# Patient Record
Sex: FEMALE | Race: OTHER | Employment: UNEMPLOYED | URBAN - METROPOLITAN AREA
[De-identification: names, ages, dates, MRNs, and addresses within clinical notes are randomized per-mention and may not be internally consistent; named-entity substitution may affect disease eponyms.]

---

## 2017-03-20 PROBLEM — Z30.431 IUD CHECK UP: Status: ACTIVE | Noted: 2017-03-20

## 2017-03-20 PROBLEM — B08.1 MOLLUSCUM CONTAGIOSUM INFECTION: Status: ACTIVE | Noted: 2017-03-20

## 2023-11-27 ENCOUNTER — OFFICE VISIT (OUTPATIENT)
Dept: FAMILY MEDICINE CLINIC | Facility: CLINIC | Age: 28
End: 2023-11-27
Payer: MEDICAID

## 2023-11-27 ENCOUNTER — LAB ENCOUNTER (OUTPATIENT)
Dept: LAB | Age: 28
End: 2023-11-27
Attending: NURSE PRACTITIONER
Payer: MEDICAID

## 2023-11-27 VITALS
HEART RATE: 100 BPM | DIASTOLIC BLOOD PRESSURE: 77 MMHG | HEIGHT: 64 IN | RESPIRATION RATE: 18 BRPM | WEIGHT: 184 LBS | OXYGEN SATURATION: 99 % | BODY MASS INDEX: 31.41 KG/M2 | SYSTOLIC BLOOD PRESSURE: 126 MMHG

## 2023-11-27 DIAGNOSIS — E66.9 CLASS 1 OBESITY WITHOUT SERIOUS COMORBIDITY WITH BODY MASS INDEX (BMI) OF 31.0 TO 31.9 IN ADULT, UNSPECIFIED OBESITY TYPE: ICD-10-CM

## 2023-11-27 DIAGNOSIS — Z00.00 GENERAL MEDICAL EXAM: ICD-10-CM

## 2023-11-27 DIAGNOSIS — Z83.3 FAMILY HISTORY OF DIABETES MELLITUS IN MOTHER: ICD-10-CM

## 2023-11-27 DIAGNOSIS — E78.2 MIXED HYPERLIPIDEMIA: ICD-10-CM

## 2023-11-27 DIAGNOSIS — Z11.3 SCREEN FOR STD (SEXUALLY TRANSMITTED DISEASE): ICD-10-CM

## 2023-11-27 DIAGNOSIS — Z76.89 ENCOUNTER TO ESTABLISH CARE: Primary | ICD-10-CM

## 2023-11-27 DIAGNOSIS — Z97.5 IUD (INTRAUTERINE DEVICE) IN PLACE: ICD-10-CM

## 2023-11-27 PROBLEM — E66.811 CLASS 1 OBESITY WITHOUT SERIOUS COMORBIDITY WITH BODY MASS INDEX (BMI) OF 31.0 TO 31.9 IN ADULT: Status: ACTIVE | Noted: 2023-11-27

## 2023-11-27 PROBLEM — B08.1 MOLLUSCUM CONTAGIOSUM INFECTION: Status: RESOLVED | Noted: 2017-03-20 | Resolved: 2023-11-27

## 2023-11-27 PROBLEM — Z87.59 HISTORY OF PREGNANCY INDUCED HYPERTENSION: Status: ACTIVE | Noted: 2023-11-27

## 2023-11-27 LAB
ALBUMIN SERPL-MCNC: 4.1 G/DL (ref 3.4–5)
ALBUMIN/GLOB SERPL: 1.1 {RATIO} (ref 1–2)
ALP LIVER SERPL-CCNC: 49 U/L
ALT SERPL-CCNC: 28 U/L
ANION GAP SERPL CALC-SCNC: 7 MMOL/L (ref 0–18)
AST SERPL-CCNC: 21 U/L (ref 15–37)
BASOPHILS # BLD AUTO: 0.05 X10(3) UL (ref 0–0.2)
BASOPHILS NFR BLD AUTO: 0.7 %
BILIRUB SERPL-MCNC: 0.4 MG/DL (ref 0.1–2)
BUN BLD-MCNC: 12 MG/DL (ref 9–23)
CALCIUM BLD-MCNC: 9.5 MG/DL (ref 8.5–10.1)
CHLORIDE SERPL-SCNC: 108 MMOL/L (ref 98–112)
CHOLEST SERPL-MCNC: 289 MG/DL (ref ?–200)
CO2 SERPL-SCNC: 24 MMOL/L (ref 21–32)
CREAT BLD-MCNC: 0.72 MG/DL
EGFRCR SERPLBLD CKD-EPI 2021: 116 ML/MIN/1.73M2 (ref 60–?)
EOSINOPHIL # BLD AUTO: 0.1 X10(3) UL (ref 0–0.7)
EOSINOPHIL NFR BLD AUTO: 1.5 %
ERYTHROCYTE [DISTWIDTH] IN BLOOD BY AUTOMATED COUNT: 11.9 %
EST. AVERAGE GLUCOSE BLD GHB EST-MCNC: 123 MG/DL (ref 68–126)
FASTING PATIENT LIPID ANSWER: YES
FASTING STATUS PATIENT QL REPORTED: YES
GLOBULIN PLAS-MCNC: 3.7 G/DL (ref 2.8–4.4)
GLUCOSE BLD-MCNC: 108 MG/DL (ref 70–99)
HBA1C MFR BLD: 5.9 % (ref ?–5.7)
HBV SURFACE AG SER-ACNC: <0.1 [IU]/L
HBV SURFACE AG SERPL QL IA: NONREACTIVE
HCT VFR BLD AUTO: 42.1 %
HDLC SERPL-MCNC: 40 MG/DL (ref 40–59)
HGB BLD-MCNC: 14.2 G/DL
IMM GRANULOCYTES # BLD AUTO: 0.03 X10(3) UL (ref 0–1)
IMM GRANULOCYTES NFR BLD: 0.4 %
LDLC SERPL CALC-MCNC: 198 MG/DL (ref ?–100)
LYMPHOCYTES # BLD AUTO: 1.95 X10(3) UL (ref 1–4)
LYMPHOCYTES NFR BLD AUTO: 29.1 %
MCH RBC QN AUTO: 30.7 PG (ref 26–34)
MCHC RBC AUTO-ENTMCNC: 33.7 G/DL (ref 31–37)
MCV RBC AUTO: 90.9 FL
MONOCYTES # BLD AUTO: 0.49 X10(3) UL (ref 0.1–1)
MONOCYTES NFR BLD AUTO: 7.3 %
NEUTROPHILS # BLD AUTO: 4.08 X10 (3) UL (ref 1.5–7.7)
NEUTROPHILS # BLD AUTO: 4.08 X10(3) UL (ref 1.5–7.7)
NEUTROPHILS NFR BLD AUTO: 61 %
NONHDLC SERPL-MCNC: 249 MG/DL (ref ?–130)
OSMOLALITY SERPL CALC.SUM OF ELEC: 288 MOSM/KG (ref 275–295)
PLATELET # BLD AUTO: 280 10(3)UL (ref 150–450)
POTASSIUM SERPL-SCNC: 4 MMOL/L (ref 3.5–5.1)
PROT SERPL-MCNC: 7.8 G/DL (ref 6.4–8.2)
RBC # BLD AUTO: 4.63 X10(6)UL
SODIUM SERPL-SCNC: 139 MMOL/L (ref 136–145)
T PALLIDUM AB SER QL IA: NONREACTIVE
TRIGL SERPL-MCNC: 258 MG/DL (ref 30–149)
TSI SER-ACNC: 1.32 MIU/ML (ref 0.36–3.74)
VLDLC SERPL CALC-MCNC: 56 MG/DL (ref 0–30)
WBC # BLD AUTO: 6.7 X10(3) UL (ref 4–11)

## 2023-11-27 PROCEDURE — 87491 CHLMYD TRACH DNA AMP PROBE: CPT

## 2023-11-27 PROCEDURE — 80061 LIPID PANEL: CPT

## 2023-11-27 PROCEDURE — 87340 HEPATITIS B SURFACE AG IA: CPT

## 2023-11-27 PROCEDURE — 87389 HIV-1 AG W/HIV-1&-2 AB AG IA: CPT

## 2023-11-27 PROCEDURE — 85025 COMPLETE CBC W/AUTO DIFF WBC: CPT

## 2023-11-27 PROCEDURE — 87591 N.GONORRHOEAE DNA AMP PROB: CPT

## 2023-11-27 PROCEDURE — 80053 COMPREHEN METABOLIC PANEL: CPT

## 2023-11-27 PROCEDURE — 84443 ASSAY THYROID STIM HORMONE: CPT

## 2023-11-27 PROCEDURE — 86780 TREPONEMA PALLIDUM: CPT

## 2023-11-27 PROCEDURE — 36415 COLL VENOUS BLD VENIPUNCTURE: CPT

## 2023-11-27 PROCEDURE — 83036 HEMOGLOBIN GLYCOSYLATED A1C: CPT

## 2023-11-27 RX ORDER — POLYMYXIN B SULFATE AND TRIMETHOPRIM 1; 10000 MG/ML; [USP'U]/ML
SOLUTION OPHTHALMIC
COMMUNITY
Start: 2023-11-15

## 2023-11-28 LAB
C TRACH DNA SPEC QL NAA+PROBE: NEGATIVE
N GONORRHOEA DNA SPEC QL NAA+PROBE: NEGATIVE

## 2023-11-29 ENCOUNTER — TELEPHONE (OUTPATIENT)
Dept: FAMILY MEDICINE CLINIC | Facility: CLINIC | Age: 28
End: 2023-11-29

## 2023-11-29 DIAGNOSIS — E78.2 MIXED HYPERLIPIDEMIA: ICD-10-CM

## 2023-11-29 DIAGNOSIS — R73.03 PREDIABETES: Primary | ICD-10-CM

## 2023-11-29 NOTE — TELEPHONE ENCOUNTER
----- Message from STANLEY Ibrahim sent at 11/29/2023  9:36 AM CST -----  A1C in prediabetic range. Needs to work on lower carb diet, recheck in 6 months  Chemistries normal except for mild hyperglycemia  Cholesterol is elevated. Needs to start statin.  If agreeable, will send Rx and plan to recheck lipid and LFTs in 6-8 weeks  Chlamydia, gonorrhea screening negative  HIV, Hepatitis B, Syphilis screening negative  Thyroid function normal  No anemia

## 2023-11-29 NOTE — TELEPHONE ENCOUNTER
LMTCB. Repeat A1C ordered. Hepatic and lipid panels pended for mid-January. Will order if pt agreeable to starting Rx.

## 2023-11-29 NOTE — TELEPHONE ENCOUNTER
Patient called back and understands her test results. Patient will recheck A1c in 6 months. Patient would like to hold off on medication and would like to eat better and exercise before starting on statin medication. Would you still like patient to recheck lipids in 6-8 weeks?

## 2023-12-18 ENCOUNTER — OFFICE VISIT (OUTPATIENT)
Dept: FAMILY MEDICINE CLINIC | Facility: CLINIC | Age: 28
End: 2023-12-18
Payer: MEDICAID

## 2023-12-18 VITALS
RESPIRATION RATE: 18 BRPM | DIASTOLIC BLOOD PRESSURE: 80 MMHG | TEMPERATURE: 98 F | WEIGHT: 183 LBS | SYSTOLIC BLOOD PRESSURE: 120 MMHG | HEART RATE: 82 BPM | BODY MASS INDEX: 31.24 KG/M2 | OXYGEN SATURATION: 98 % | HEIGHT: 64 IN

## 2023-12-18 DIAGNOSIS — Z30.431 IUD CHECK UP: ICD-10-CM

## 2023-12-18 DIAGNOSIS — T83.32XA INTRAUTERINE CONTRACEPTIVE DEVICE THREADS LOST, INITIAL ENCOUNTER: ICD-10-CM

## 2023-12-18 DIAGNOSIS — Z12.4 CERVICAL CANCER SCREENING: Primary | ICD-10-CM

## 2023-12-18 PROCEDURE — 3079F DIAST BP 80-89 MM HG: CPT | Performed by: NURSE PRACTITIONER

## 2023-12-18 PROCEDURE — 88175 CYTOPATH C/V AUTO FLUID REDO: CPT | Performed by: NURSE PRACTITIONER

## 2023-12-18 PROCEDURE — 3074F SYST BP LT 130 MM HG: CPT | Performed by: NURSE PRACTITIONER

## 2023-12-18 PROCEDURE — 99213 OFFICE O/P EST LOW 20 MIN: CPT | Performed by: NURSE PRACTITIONER

## 2023-12-18 PROCEDURE — 3008F BODY MASS INDEX DOCD: CPT | Performed by: NURSE PRACTITIONER

## 2023-12-22 ENCOUNTER — TELEPHONE (OUTPATIENT)
Dept: FAMILY MEDICINE CLINIC | Facility: CLINIC | Age: 28
End: 2023-12-22

## 2023-12-22 LAB
.: NORMAL
.: NORMAL

## 2023-12-22 NOTE — TELEPHONE ENCOUNTER
----- Message from STANLEY Arechiga sent at 12/22/2023  8:23 AM CST -----  Pap normal except for evidence of yeast  Only needs treatment for yeast if she is symptomatic.  Please check with her  Repeat in pap in 3 years no concerns

## 2023-12-22 NOTE — TELEPHONE ENCOUNTER
Patient advised and verbalized understanding.   Patient denies symptoms of yeast infection  Patient will call if she develops symptoms  Reminder placed for pap in 3 years

## 2023-12-30 ENCOUNTER — HOSPITAL ENCOUNTER (OUTPATIENT)
Dept: ULTRASOUND IMAGING | Age: 28
Discharge: HOME | End: 2023-12-30
Attending: NURSE PRACTITIONER
Payer: MEDICAID

## 2023-12-30 DIAGNOSIS — Z30.431 IUD CHECK UP: ICD-10-CM

## 2023-12-30 DIAGNOSIS — T83.32XA INTRAUTERINE CONTRACEPTIVE DEVICE THREADS LOST, INITIAL ENCOUNTER: ICD-10-CM

## 2023-12-30 PROCEDURE — 76830 TRANSVAGINAL US NON-OB: CPT | Performed by: NURSE PRACTITIONER

## 2023-12-30 PROCEDURE — 76856 US EXAM PELVIC COMPLETE: CPT | Performed by: NURSE PRACTITIONER

## 2024-01-02 ENCOUNTER — TELEPHONE (OUTPATIENT)
Dept: FAMILY MEDICINE CLINIC | Facility: CLINIC | Age: 29
End: 2024-01-02

## 2024-01-02 NOTE — TELEPHONE ENCOUNTER
----- Message from STANLEY Zelaya sent at 12/31/2023  1:27 PM CST -----  IUD is within the uterus. When it is time for removal, will need to see gyne for evaluation

## 2024-04-01 ENCOUNTER — TELEPHONE (OUTPATIENT)
Dept: FAMILY MEDICINE CLINIC | Facility: CLINIC | Age: 29
End: 2024-04-01

## 2024-05-01 ENCOUNTER — TELEPHONE (OUTPATIENT)
Dept: FAMILY MEDICINE CLINIC | Facility: CLINIC | Age: 29
End: 2024-05-01

## 2024-07-16 ENCOUNTER — HOSPITAL ENCOUNTER (OUTPATIENT)
Age: 29
Discharge: ANOTHER HOSPITAL | End: 2024-07-16
Payer: MEDICAID

## 2024-07-16 ENCOUNTER — ANESTHESIA (OUTPATIENT)
Dept: SURGERY | Facility: HOSPITAL | Age: 29
End: 2024-07-16
Payer: MEDICAID

## 2024-07-16 ENCOUNTER — HOSPITAL ENCOUNTER (OUTPATIENT)
Facility: HOSPITAL | Age: 29
Setting detail: OBSERVATION
Discharge: HOME | End: 2024-07-17
Attending: EMERGENCY MEDICINE | Admitting: STUDENT IN AN ORGANIZED HEALTH CARE EDUCATION/TRAINING PROGRAM
Payer: MEDICAID

## 2024-07-16 ENCOUNTER — ANESTHESIA EVENT (OUTPATIENT)
Dept: SURGERY | Facility: HOSPITAL | Age: 29
End: 2024-07-16
Payer: MEDICAID

## 2024-07-16 ENCOUNTER — APPOINTMENT (OUTPATIENT)
Dept: CT IMAGING | Age: 29
End: 2024-07-16
Attending: NURSE PRACTITIONER
Payer: MEDICAID

## 2024-07-16 VITALS
TEMPERATURE: 98 F | DIASTOLIC BLOOD PRESSURE: 90 MMHG | RESPIRATION RATE: 16 BRPM | SYSTOLIC BLOOD PRESSURE: 129 MMHG | OXYGEN SATURATION: 100 % | HEART RATE: 96 BPM

## 2024-07-16 DIAGNOSIS — K35.30 ACUTE APPENDICITIS WITH LOCALIZED PERITONITIS, WITHOUT PERFORATION, ABSCESS, OR GANGRENE: Primary | ICD-10-CM

## 2024-07-16 DIAGNOSIS — K37 APPENDICITIS: ICD-10-CM

## 2024-07-16 DIAGNOSIS — R10.31 COLICKY RLQ ABDOMINAL PAIN: Primary | ICD-10-CM

## 2024-07-16 DIAGNOSIS — G89.18 POST-OP PAIN: ICD-10-CM

## 2024-07-16 DIAGNOSIS — K35.80 ACUTE APPENDICITIS, UNSPECIFIED ACUTE APPENDICITIS TYPE: ICD-10-CM

## 2024-07-16 LAB
#MXD IC: 0.5 X10ˆ3/UL (ref 0.1–1)
B-HCG UR QL: NEGATIVE
BILIRUB UR QL STRIP: NEGATIVE
BUN BLD-MCNC: 11 MG/DL (ref 7–18)
CHLORIDE BLD-SCNC: 106 MMOL/L (ref 98–112)
CLARITY UR: CLEAR
CO2 BLD-SCNC: 23 MMOL/L (ref 21–32)
COLOR UR: YELLOW
CREAT BLD-MCNC: 0.6 MG/DL
EGFRCR SERPLBLD CKD-EPI 2021: 124 ML/MIN/1.73M2 (ref 60–?)
GLUCOSE BLD-MCNC: 98 MG/DL (ref 70–99)
GLUCOSE UR STRIP-MCNC: NEGATIVE MG/DL
HCT VFR BLD AUTO: 43.8 %
HCT VFR BLD CALC: 44 %
HGB BLD-MCNC: 14.7 G/DL
ISTAT IONIZED CALCIUM FOR CHEM 8: 1.19 MMOL/L (ref 1.12–1.32)
KETONES UR STRIP-MCNC: NEGATIVE MG/DL
LYMPHOCYTES # BLD AUTO: 1.4 X10ˆ3/UL (ref 1–4)
LYMPHOCYTES NFR BLD AUTO: 12.9 %
MCH RBC QN AUTO: 30.8 PG (ref 26–34)
MCHC RBC AUTO-ENTMCNC: 33.6 G/DL (ref 31–37)
MCV RBC AUTO: 91.6 FL (ref 80–100)
MIXED CELL %: 4.7 %
NEUTROPHILS # BLD AUTO: 9 X10ˆ3/UL (ref 1.5–7.7)
NEUTROPHILS NFR BLD AUTO: 82.4 %
NITRITE UR QL STRIP: NEGATIVE
PH UR STRIP: 6 [PH]
PLATELET # BLD AUTO: 263 X10ˆ3/UL (ref 150–450)
POTASSIUM BLD-SCNC: 4.1 MMOL/L (ref 3.6–5.1)
PROT UR STRIP-MCNC: NEGATIVE MG/DL
RBC # BLD AUTO: 4.78 X10ˆ6/UL
SODIUM BLD-SCNC: 138 MMOL/L (ref 136–145)
SP GR UR STRIP: 1.01
UROBILINOGEN UR STRIP-ACNC: <2 MG/DL
WBC # BLD AUTO: 10.9 X10ˆ3/UL (ref 4–11)

## 2024-07-16 PROCEDURE — 80047 BASIC METABLC PNL IONIZED CA: CPT | Performed by: NURSE PRACTITIONER

## 2024-07-16 PROCEDURE — 81025 URINE PREGNANCY TEST: CPT | Performed by: NURSE PRACTITIONER

## 2024-07-16 PROCEDURE — 99215 OFFICE O/P EST HI 40 MIN: CPT | Performed by: NURSE PRACTITIONER

## 2024-07-16 PROCEDURE — 99222 1ST HOSP IP/OBS MODERATE 55: CPT | Performed by: SURGERY

## 2024-07-16 PROCEDURE — 85025 COMPLETE CBC W/AUTO DIFF WBC: CPT | Performed by: NURSE PRACTITIONER

## 2024-07-16 PROCEDURE — 74177 CT ABD & PELVIS W/CONTRAST: CPT | Performed by: NURSE PRACTITIONER

## 2024-07-16 PROCEDURE — 0DTJ4ZZ RESECTION OF APPENDIX, PERCUTANEOUS ENDOSCOPIC APPROACH: ICD-10-PCS | Performed by: STUDENT IN AN ORGANIZED HEALTH CARE EDUCATION/TRAINING PROGRAM

## 2024-07-16 PROCEDURE — 81002 URINALYSIS NONAUTO W/O SCOPE: CPT | Performed by: NURSE PRACTITIONER

## 2024-07-16 RX ORDER — ACETAMINOPHEN 325 MG/1
650 TABLET ORAL EVERY 4 HOURS PRN
Status: DISCONTINUED | OUTPATIENT
Start: 2024-07-16 | End: 2024-07-17

## 2024-07-16 RX ORDER — HYDROCODONE BITARTRATE AND ACETAMINOPHEN 5; 325 MG/1; MG/1
2 TABLET ORAL EVERY 4 HOURS PRN
Status: DISCONTINUED | OUTPATIENT
Start: 2024-07-16 | End: 2024-07-17

## 2024-07-16 RX ORDER — ONDANSETRON 2 MG/ML
4 INJECTION INTRAMUSCULAR; INTRAVENOUS EVERY 6 HOURS PRN
Status: DISCONTINUED | OUTPATIENT
Start: 2024-07-16 | End: 2024-07-17

## 2024-07-16 RX ORDER — HYDROMORPHONE HYDROCHLORIDE 1 MG/ML
0.4 INJECTION, SOLUTION INTRAMUSCULAR; INTRAVENOUS; SUBCUTANEOUS EVERY 2 HOUR PRN
Status: DISCONTINUED | OUTPATIENT
Start: 2024-07-16 | End: 2024-07-17

## 2024-07-16 RX ORDER — HYDROMORPHONE HYDROCHLORIDE 1 MG/ML
0.8 INJECTION, SOLUTION INTRAMUSCULAR; INTRAVENOUS; SUBCUTANEOUS EVERY 2 HOUR PRN
Status: DISCONTINUED | OUTPATIENT
Start: 2024-07-16 | End: 2024-07-17

## 2024-07-16 RX ORDER — ACETAMINOPHEN 10 MG/ML
1000 INJECTION, SOLUTION INTRAVENOUS EVERY 6 HOURS
Status: DISCONTINUED | OUTPATIENT
Start: 2024-07-16 | End: 2024-07-17

## 2024-07-16 RX ORDER — HYDROCODONE BITARTRATE AND ACETAMINOPHEN 5; 325 MG/1; MG/1
1 TABLET ORAL EVERY 4 HOURS PRN
Status: DISCONTINUED | OUTPATIENT
Start: 2024-07-16 | End: 2024-07-17

## 2024-07-16 RX ORDER — HYDROMORPHONE HYDROCHLORIDE 1 MG/ML
0.5 INJECTION, SOLUTION INTRAMUSCULAR; INTRAVENOUS; SUBCUTANEOUS EVERY 30 MIN PRN
Status: DISCONTINUED | OUTPATIENT
Start: 2024-07-16 | End: 2024-07-16

## 2024-07-16 RX ORDER — SODIUM CHLORIDE 9 MG/ML
1000 INJECTION, SOLUTION INTRAVENOUS ONCE
Status: COMPLETED | OUTPATIENT
Start: 2024-07-16 | End: 2024-07-16

## 2024-07-16 RX ORDER — PROCHLORPERAZINE EDISYLATE 5 MG/ML
5 INJECTION INTRAMUSCULAR; INTRAVENOUS EVERY 8 HOURS PRN
Status: DISCONTINUED | OUTPATIENT
Start: 2024-07-16 | End: 2024-07-17

## 2024-07-16 RX ORDER — HEPARIN SODIUM 5000 [USP'U]/ML
5000 INJECTION, SOLUTION INTRAVENOUS; SUBCUTANEOUS EVERY 8 HOURS SCHEDULED
Status: DISCONTINUED | OUTPATIENT
Start: 2024-07-16 | End: 2024-07-17

## 2024-07-16 RX ORDER — ONDANSETRON 2 MG/ML
4 INJECTION INTRAMUSCULAR; INTRAVENOUS EVERY 4 HOURS PRN
Status: DISCONTINUED | OUTPATIENT
Start: 2024-07-16 | End: 2024-07-16

## 2024-07-16 RX ORDER — SODIUM CHLORIDE 9 MG/ML
INJECTION, SOLUTION INTRAVENOUS CONTINUOUS
Status: DISCONTINUED | OUTPATIENT
Start: 2024-07-16 | End: 2024-07-17

## 2024-07-16 RX ORDER — SODIUM CHLORIDE 9 MG/ML
INJECTION, SOLUTION INTRAVENOUS CONTINUOUS
Status: ACTIVE | OUTPATIENT
Start: 2024-07-16 | End: 2024-07-16

## 2024-07-16 RX ORDER — HYDROMORPHONE HYDROCHLORIDE 1 MG/ML
0.2 INJECTION, SOLUTION INTRAMUSCULAR; INTRAVENOUS; SUBCUTANEOUS EVERY 2 HOUR PRN
Status: DISCONTINUED | OUTPATIENT
Start: 2024-07-16 | End: 2024-07-17

## 2024-07-16 RX ADMIN — SODIUM CHLORIDE, SODIUM LACTATE, POTASSIUM CHLORIDE, CALCIUM CHLORIDE: 600; 310; 30; 20 INJECTION, SOLUTION INTRAVENOUS at 23:55:00

## 2024-07-16 RX ADMIN — MIDAZOLAM HYDROCHLORIDE 2 MG: 1 INJECTION INTRAMUSCULAR; INTRAVENOUS at 23:55:00

## 2024-07-16 NOTE — H&P
[facility]  Report of Consultation    Adrianna Mcdonald Patient Status:  Emergency    1995 MRN [id number]   Location [facility] EMERGENCY DEPARTMENT Attending Ran Ward MD   Hosp Day # 0 PCP Do Trevizo MD       Chief Complaint:  Abdominal pain    History of Present Illness:  Adrianna Mcdonald is a a(n) 29 year old female who presents to [facility] ER with worsening abdominal pain.    She reports onset of  periumbilical abdominal pain earlier this morning. She states her pain does not radiate and states it waxes and wanes in intensity. She reports associated nausea and 2 episodes of emesis. She denies fevers or chills. She denies changes in her bowel movements.     Upon presentation to the ER, the patient was hemodynamically stable. CBC reveals WBC 10.9 with left shift of 9.0, hemoglobin 14.7, platelets 263,000. No electrolyte abnormalities. No acute kidney injury.     CT of the appendix reveals slightly prominent appendix measuring 8 mm with mucosal enhancement and a 3 mm appendicolith distally.  No significant periappendiceal inflammatory changes, but there is trace free fluid.  Findings are consistent with early acute appendicitis.    The patient has significant past surgical history.  She denies significant past medical history.  She denies taking blood thinning medications.      History:  History reviewed. No pertinent past medical history.  Past Surgical History:   Procedure Laterality Date    Mirena, iud, 4 year  16      15     Family History   Problem Relation Age of Onset    Diabetes Mother     Fibromyalgia Mother     Diabetes Maternal Grandmother     High Blood Pressure Maternal Aunt     High Cholesterol Maternal Aunt     Cancer Maternal Uncle         brain      reports that she has never smoked. She has never used smokeless tobacco. She reports current alcohol use. She reports that she does not currently use drugs.    Allergies:  No Known  Allergies    Medications:  (Not in a hospital admission)        Current Facility-Administered Medications:     sodium chloride 0.9% infusion, , Intravenous, Continuous    Review of Systems:  Review of Systems   Constitutional:  Negative for chills, diaphoresis, fatigue, fever and unexpected weight change.   HENT:  Negative for hearing loss, nosebleeds, sore throat and trouble swallowing.    Respiratory:  Negative for apnea, cough, shortness of breath and wheezing.    Cardiovascular:  Negative for chest pain, palpitations and leg swelling.   Gastrointestinal:  Positive for abdominal pain, nausea and vomiting. Negative for abdominal distention, anal bleeding, blood in stool, constipation and diarrhea.   Genitourinary:  Negative for difficulty urinating, dysuria, frequency and urgency.   Musculoskeletal:  Negative for arthralgias and myalgias.   Skin:  Negative for color change and rash.   Neurological:  Negative for tremors, syncope and weakness.   Hematological:  Negative for adenopathy. Does not bruise/bleed easily.   Psychiatric/Behavioral:  Negative for behavioral problems and sleep disturbance.         Physical Exam:  /70   Pulse 77   Temp 97.8 °F (36.6 °C) (Temporal)   Resp 18   Ht 64\"   Wt 180 lb (81.6 kg)   LMP  (LMP Unknown)   SpO2 98%   BMI 30.90 kg/m²   Physical Exam  Constitutional:       Appearance: Normal appearance.   HENT:      Head: Normocephalic and atraumatic.   Eyes:      Extraocular Movements: Extraocular movements intact.      Pupils: Pupils are equal, round, and reactive to light.   Cardiovascular:      Rate and Rhythm: Normal rate and regular rhythm.   Pulmonary:      Effort: Pulmonary effort is normal. No respiratory distress.   Abdominal:      General: Abdomen is flat. Bowel sounds are normal. There is no distension.      Palpations: Abdomen is soft. There is no mass.      Tenderness: There is abdominal tenderness in the periumbilical area. There is no guarding or rebound.  Negative signs include Rovsing's sign.   Musculoskeletal:         General: Normal range of motion.      Cervical back: Normal range of motion.   Skin:     General: Skin is warm.      Coloration: Skin is not jaundiced or pale.      Findings: No erythema.   Neurological:      General: No focal deficit present.      Mental Status: She is alert and oriented to person, place, and time.            Laboratory Data:  Recent Labs   Lab 07/16/24  1049   MCV 91.6   MCHC 33.6       No results for input(s): \"GLU\", \"BUN\", \"CREATSERUM\", \"GFRAA\", \"GFRNAA\", \"CA\", \"ALB\", \"NA\", \"K\", \"CL\", \"CO2\", \"ALKPHO\", \"AST\", \"ALT\", \"BILT\", \"TP\" in the last 168 hours.      No results for input(s): \"PTP\", \"INR\", \"PTT\" in the last 168 hours.      CT APPENDIX ABD/PEL W CONTRAST (CPT=74177)    Result Date: 7/16/2024  CONCLUSION:  1. Findings concerning for early acute appendicitis as detailed above.   LOCATION:  [facility]   Dictated by (CST): Pattie Pal MD on 7/16/2024 at 11:16 AM     Finalized by (CST): Pattie Pal MD on 7/16/2024 at 11:22 AM          VALENTINA Mcrae  7/16/2024  3:07 PM     Is this a shared or split note between Advanced Practice Provider and Physician? Yes      Impression:  Patient Active Problem List   Diagnosis    IUD check up    Family history of diabetes mellitus in mother    Mixed hyperlipidemia    Class 1 obesity without serious comorbidity with body mass index (BMI) of 31.0 to 31.9 in adult    History of pregnancy induced hypertension    Colicky RLQ abdominal pain    Acute appendicitis with localized peritonitis, without perforation, abscess, or gangrene       29-year-old female who presents to the emergency department with periumbilical abdominal pain that subsequently localized to the right lower quadrant.  The patient had associated anorexia, nausea as well as emesis.  The patient is being seen today with her aunt at the bedside  Workup in the emergency department revealed a WBC count of 10.9.  CT scan reveals a prominent  appendix measuring 8 mm with mucosal enhancement and a 3 mm appendicolith distally.  Trace amount of periappendiceal fluid is noted.  Findings are suspicious for early acute appendicitis  Treatment options were reviewed in detail with Adrianna.  At this time she does wish to proceed with laparoscopic appendectomy for acute appendicitis  The risks, benefits, complications, possible outcomes and alternatives of the procedure were explained to the patient in detail.  Potential complications of this procedure and as with any surgical procedure and anesthesia were reviewed including but not limited to bleeding, infection, thromboembolic event, pneumonia were discussed.  Expected postoperative pain, recuperation and postoperative course and possible complications were also reviewed.  All questions from the patient were answered in detail.  The patient did verbalize understanding and does not have any further questions at this time.  The patient does wish to proceed with the proposed procedure.    Due to or availability and timing of procedure, procedure will be performed by Dr. Juarez.  Adrianna is comfortable with this treatment plan.  She has no further questions at this time.  MURALI Phan MD, FACS    Please note that this report has been produced using speech recognition software and may contain errors related to that system including but not limited to errors in grammar, punctuation and spelling as well as words and phrases that possibly may have been recognized inappropriately.  If there are any questions or concerns please contact the dictating provider for clarification.  The 21st Century Cures Act makes medical notes like these available to patients in the interest of trans parency. Please be advised this is a medical document. Medical documents are intended to carry relevant information, facts as evident, and the clinical opinion of the practitioner. The medical note is intended as peer to peer communication and  may appear blunt or direct. It is written in medical language and may contain abbreviations or verbiage that are unfamiliar.  If there are any questions or concerns please contact the dictating provider for clarification.

## 2024-07-16 NOTE — DISCHARGE INSTRUCTIONS
Please go directly to the [facility] ER for further evaluation do not stop to eat or drink anything until evaluated by the ER staff

## 2024-07-16 NOTE — ED QUICK NOTES
Orders for admission, patient is aware of plan and ready to go upstairs. Any questions, please call ED RN Lori at extension [phone].     Patient Covid vaccination status: Partially vaccinated     COVID Test Ordered in ED: None    COVID Suspicion at Admission: N/A    Running Infusions:    sodium chloride 125 mL/hr at 07/16/24 1403        Mental Status/LOC at time of transport: A&Ox3    Other pertinent information: plan for surgery at 2300, so patient will be admitted to floor first  CIWA score: N/A   NIH score:  N/A

## 2024-07-16 NOTE — DISCHARGE INSTRUCTIONS
Post-Surgical Discharge Instructions    Luke Juarez MD    Pain: You may take acetaminophen (Tylenol) up to 650 mg every 6 hours as needed for mild-moderate pain. You may take ibuprofen (Motrin) up to 600 mg every 6 hours as needed for mild-moderate pain. Take narcotic pain medication as needed for severe pain per your prescription. Do not drive while taking narcotic pain medication. Many patients take a stool softener as narcotics are known to cause constipation. Okay to use ice packs over abdomen    Diet:  No restrictions.    Activity:  No heavy lifting greater than 10 lbs and no exercising for a total of 6 weeks from the date of surgery.  You may walk and climb stairs with moderation. If your abdomen is more uncomfortable than the day before, you need to be less active as you may be pulling on your stitches.    Bathing:  You may shower as often as you would like, but no submerging the incisions under water for a total of 2 weeks from the date of surgery.  This includes no swimming, no baths, and no hot-tubs.      Wound:  Keep the wound dry.  No dressing is necessary unless there is drainage coming from the wound.  If the drainage is excessive or looks like pus, please call our office.    Driving: You may drive provided that you are no longer taking your narcotic pain medication.      Bowel Function:  After surgery, your bowel movements will be irregular.  It is normal to have up to 3 bowel movements a day or as low as 1 bowel movement every 3 days.  Occasionally, your movements may be even more irregular than this.  As long as you are not vomiting or have a fever over 100.3, you don’t need to be overly concerned.      Return to Work:  Most patients return to work after 1-2 weeks from the date of their surgery.  You will still have a lifting restriction of no greater than 10 lbs from the date of your surgery until 6 weeks.  If your work requires heavy lifting, you will need to stay off work for 6 weeks.  When  you are ready to return to work, please call the office and we will send a work release to your employer.      Appointment: Please call our office for an appointment in 10-14 days after surgery, unless otherwise instructed.  This will allow ample time for the swelling and soreness to resolve before your wound is examined. If you have fevers, chills, or if you are concerned about your wound, please call us immediately at [phone].      Thank you for entrusting us with your care.

## 2024-07-16 NOTE — PLAN OF CARE
Patient is alert and oriented. On room air. Patient c/o some abdominal but politely declines pain medication at this time. Patient denies nausea. Patient is due to void. Receiving IVF. Patient is kept NPO for surgery later tonight. Call light within reach.

## 2024-07-16 NOTE — PROGRESS NOTES
NURSING ADMISSION NOTE      Patient admitted via Cart  Oriented to room.  Safety precautions initiated.  Bed in low position.  Call light in reach.    Patient arrived from ED in stable condition.

## 2024-07-16 NOTE — ED PROVIDER NOTES
Patient Seen in: [facility] Emergency Department      History     Chief Complaint   Patient presents with    Abdomen/Flank Pain     Stated Complaint: ABDOMINAL PAIN, +APPE FROM IC    Subjective:   HPI    Patient comes in from immediate care after being evaluated there for abdominal pain.  Imaging studies at that time revealed a CT scan which revealed early appendicitis.  Patient was sent to emergency department to facilitate arrangements for appendectomy.  Patient complains of mild periumbilical discomfort which has been there since this morning.  She has had no preceding symptoms yesterday.    Objective:   History reviewed. No pertinent past medical history.           Past Surgical History:   Procedure Laterality Date    Mirena, iud, 4 year  16      15                Social History     Socioeconomic History    Marital status: Single   Tobacco Use    Smoking status: Never    Smokeless tobacco: Never   Vaping Use    Vaping status: Never Used   Substance and Sexual Activity    Alcohol use: Yes     Alcohol/week: 0.0 standard drinks of alcohol     Comment: social    Drug use: Not Currently    Sexual activity: Yes     Partners: Male     Birth control/protection: Mirena     Social Determinants of Health     Food Insecurity: No Food Insecurity (2024)    Food Insecurity     Food Insecurity: Never true   Transportation Needs: No Transportation Needs (2024)    Transportation Needs     Lack of Transportation: No   Housing Stability: Low Risk  (2024)    Housing Stability     Housing Instability: No              Review of Systems    Positive for stated Chief Complaint: Abdomen/Flank Pain    Other systems are as noted in HPI.  Constitutional and vital signs reviewed.      All other systems reviewed and negative except as noted above.    Physical Exam     ED Triage Vitals [24 1328]   /79   Pulse 72   Resp 16   Temp 97.8 °F (36.6 °C)   Temp src Temporal   SpO2 98 %   O2 Device None  (Room air)       Current Vitals:   Vital Signs  BP: 119/53  Pulse: 72  Resp: 16  Temp: 98.2 °F (36.8 °C)  Temp src: Oral  MAP (mmHg): 69    Oxygen Therapy  SpO2: 99 %  O2 Device: None (Room air)  O2 Flow Rate (L/min): 0 L/min  Pulse Oximetry Type: Intermittent  Oximetry Probe Site Changed: Yes  Pulse Ox Probe Location: Right hand            Physical Exam  Vitals and nursing note reviewed.   Constitutional:       Appearance: She is well-developed.   HENT:      Head: Normocephalic.   Cardiovascular:      Rate and Rhythm: Normal rate and regular rhythm.      Heart sounds: Normal heart sounds. No murmur heard.  Pulmonary:      Effort: Pulmonary effort is normal. No respiratory distress.      Breath sounds: Normal breath sounds.   Abdominal:      General: Bowel sounds are normal.      Palpations: Abdomen is soft.      Tenderness: There is abdominal tenderness in the periumbilical area. There is no rebound.      Comments: Mild periumbilical discomfort to palpation without any peritoneal findings.   Musculoskeletal:         General: No tenderness. Normal range of motion.      Cervical back: Normal range of motion and neck supple.   Lymphadenopathy:      Cervical: No cervical adenopathy.   Skin:     General: Skin is warm and dry.      Findings: No rash.   Neurological:      Mental Status: She is alert and oriented to person, place, and time.      Sensory: No sensory deficit.              ED Course     Labs Reviewed   RAINBOW DRAW LAVENDER   RAINBOW DRAW LIGHT GREEN   RAINBOW DRAW BLUE   RAINBOW DRAW GOLD          ED Course as of 07/16/24 2146  ------------------------------------------------------------  Time: 07/16 1488  Comment: Review of CT scan performed arranged by immediate care, mild, early appendicitis is noted.              MDM      Patient comes emergency department with history of abdominal pain starting today and appendicitis confirmed on CT scan.  Other differentials such as diverticulitis and bowel obstruction  were not apparent on CT scan.  Patient was hospitalized in preparation for appendectomy.  Admission disposition: 7/16/2024  2:49 PM                                        Medical Decision Making      Disposition and Plan     Clinical Impression:  1. Acute appendicitis with localized peritonitis, without perforation, abscess, or gangrene         Disposition:  Admit  7/16/2024  2:49 pm                      Hospital Problems       Present on Admission  Date Reviewed: 12/18/2023            ICD-10-CM Noted POA    * (Principal) Acute appendicitis with localized peritonitis, without perforation, abscess, or gangrene K35.30 7/16/2024 Unknown

## 2024-07-16 NOTE — ED INITIAL ASSESSMENT (HPI)
Pt in from urgent care with positive appendicitis. Pt reports mid upper abdominal pain that comes and goes. Pt reports feeling nauseous. Pt has an IV in from urgent care. Pt was not given anything for pain or nausea. Pt was only given fluids.

## 2024-07-16 NOTE — ED PROVIDER NOTES
Patient Seen in: [facility]      History     Chief Complaint   Patient presents with    Nausea/vomiting     Stated Complaint: vomit; diarrhea    Subjective:   HPI    29-year-old female presents to the immediate care with complaints of vomiting and diarrhea that started this morning had 2 episodes of diarrhea but just has generalized right lower abdominal pain.  Pt states the pain is waxing and waning.  Denies any previous surgical history.  Patient does have an IUD in.  No previous surgical history on her abdomen.  No fever.  No recent travel no recent antibiotic use.  Patient has no other issues complaints or concerns.    Objective:   History reviewed. No pertinent past medical history.           Past Surgical History:   Procedure Laterality Date    Mirena, iud, 4 year  17      15                Social History     Socioeconomic History    Marital status: Single   Tobacco Use    Smoking status: Never    Smokeless tobacco: Never   Vaping Use    Vaping status: Never Used   Substance and Sexual Activity    Alcohol use: Yes     Alcohol/week: 0.0 standard drinks of alcohol     Comment: social    Drug use: Not Currently    Sexual activity: Yes     Partners: Male     Birth control/protection: Mirena              Review of Systems    Positive for stated Chief Complaint: Nausea/vomiting    Other systems are as noted in HPI.  Constitutional and vital signs reviewed.      All other systems reviewed and negative except as noted above.    Physical Exam     ED Triage Vitals [24 1020]   BP (!) 158/95   Pulse 94   Resp 16   Temp 98 °F (36.7 °C)   Temp src Temporal   SpO2 99 %   O2 Device None (Room air)       Current Vitals:   Vital Signs  BP: 129/90  Pulse: 96  Resp: 16  Temp: 98 °F (36.7 °C)  Temp src: Temporal    Oxygen Therapy  SpO2: 100 %  O2 Device: None (Room air)            Physical Exam    GENERAL: well developed, well nourished, in distress and in pain: no  SKIN: no rashes, no suspicious  lesions  Head: Normocephalic and atraumatic   Eyes: PERRLA+, EOMI+.  Conjunctiva normal.  Cornea clear.  Ears: normal pending membranes.  Normal external auditory canals   Nose: Nasal mucosa.  No sinus tenderness.  No nasal congestion.    Throat: Oropharynx noninjected.  No lip, tongue, throat swelling. Buccal mucosa moist: yes  EYES: PERRLA, EOMI, normal optic disk,conjunctiva are clear  NECK: supple, no adenopathy, no bruits  CHEST: no chest tenderness  LUNGS: clear to auscultation  CARDIO: RRR without murmur  GI: soft, non distended. No visible peristalsis. No masses. No organomegaly. Tenderness in RLQ. Bowel sounds: Normal active x 4. Guarding : no. Rigidity: no.   NEURO: Oriented times three, cranial nerves are intact, motor and sensory are grossly intact      ED Course     Labs Reviewed   POCT CBC - Abnormal; Notable for the following components:       Result Value    # Neutrophil 9.0 (*)     All other components within normal limits   EM POCT URINALYSIS DIPSTICK - Abnormal; Notable for the following components:    Blood, Urine Small (*)     Leukocyte esterase urine Small (*)     All other components within normal limits   POCT PREGNANCY URINE - Normal   POCT ISTAT CHEM8 CARTRIDGE - Normal     CT APPENDIX ABD/PEL W CONTRAST (CPT=74177)    Result Date: 7/16/2024  PROCEDURE:  CT APPENDIX ABD/PEL W CONTRAST (CPT=74177)  COMPARISON:  None.  INDICATIONS:  RLQ pain  TECHNIQUE:  Axial helical acquisitions are obtained from through the abdomen and pelvis after bolus intravenous nonionic contrast administration.  Images of the right lower quadrant reconstructed at 2.5 mm. Coronal MPR imaging was obtained.  Dose reduction techniques were used. Dose information is transmitted to the ACR (American College of Radiology) NRDR (National Radiology Data Registry) which includes the Dose Index Registry.  PATIENT STATED HISTORY:(As transcribed by Technologist)  Patient states she has had mid abdomen pain, nausea and vomiting  since this morning.   CONTRAST USED:  100cc of Isovue 370  FINDINGS:  APPENDIX:  The appendix is slightly prominent in size measuring 8 mm with mucosal enhancement and a 3 mm appendicolith distally.  No significant periappendiceal inflammatory changes but there is trace fluid.  Findings concerning for early acute appendicitis. LIVER:  No enlargement, atrophy, abnormal density, or significant focal lesion.  BILIARY:  No visible dilatation or calcification.  PANCREAS:  No lesion, fluid collection, ductal dilatation, or atrophy.  SPLEEN:  No enlargement or focal lesion.  KIDNEYS:  No mass, obstruction, or calcification.  ADRENALS:  No mass or enlargement.  AORTA/VASCULAR:  No aneurysm.  RETROPERITONEUM:  No mass or adenopathy.  BOWEL/MESENTERY:  Normal caliber small and large bowel.  Fluid contents predominantly within the colon, correlate clinically.  ABDOMINAL WALL:  No mass or hernia.  URINARY BLADDER:  The bladder is not well distended may account for diffuse bladder wall thickening, correlate clinically with cystitis.  No visible focal wall thickening, lesion, or calculus.  PELVIC NODES:  No adenopathy.  PELVIC ORGANS:  Intrauterine device.  Pelvic organs appropriate for patient age.  Trace free pelvic fluid is predominantly adjacent to a small, 1.5 cm left adnexal low-density lesion with peripheral enhancement may represent a resolving cyst. BONES:  No bony lesion or fracture.  LUNG BASES:  No visible pulmonary or pleural disease.             CONCLUSION:  1. Findings concerning for early acute appendicitis as detailed above.   LOCATION:  [facility]   Dictated by (CST): Pattie Pal MD on 7/16/2024 at 11:16 AM     Finalized by (CST): Pattie Pal MD on 7/16/2024 at 11:22 AM        This case was discussed with my supervising physician, Dr. Rodriguez via  telephone.  We discussed the clinical presentation, my exam, testing methodology and agreed on plan of care.  Dr. Phan from general surgery was consulted in regards to patient's  CT findings.  She agrees that patient should be sent to the ER  MDM   Patient is to go to the ER for further evaluation of acute appendicitis patient is to go via POV patient is in stable condition vital signs are reassuring.  Patient was advised not to eat or drink anything until evaluated by the ER staff              Medical Decision Making      Disposition and Plan     Clinical Impression:  1. Colicky RLQ abdominal pain    2. Acute appendicitis, unspecified acute appendicitis type         Disposition:  Ic to ed  7/16/2024 12:32 pm    Follow-up:  [facility]  [address] [address]  [phone]  Today            Medications Prescribed:  Discharge Medication List as of 7/16/2024 12:33 PM

## 2024-07-16 NOTE — ED QUICK NOTES
Rounding Completed    Plan of Care reviewed. Waiting for surgery to call back with a plan or estimate of time for surgery.  Provided 0.9NS as ordered, aware she is to be NPO at this time.    Bed is locked and in lowest position. Call light within reach.

## 2024-07-16 NOTE — ED QUICK NOTES
Patient reports with abdominal pain and some nausea started this morning.  Reports she was sent here from immediate care for appendicitis, PIV in at this time, report they are here while awaiting surgery.    Patient sitting in chair by visitor, will call once she is able to change into gown and is ready for assessment.

## 2024-07-17 ENCOUNTER — TELEPHONE (OUTPATIENT)
Dept: ADMINISTRATIVE | Facility: HOSPITAL | Age: 29
End: 2024-07-17

## 2024-07-17 VITALS
HEIGHT: 64 IN | HEART RATE: 74 BPM | OXYGEN SATURATION: 96 % | SYSTOLIC BLOOD PRESSURE: 119 MMHG | BODY MASS INDEX: 30.73 KG/M2 | WEIGHT: 180 LBS | DIASTOLIC BLOOD PRESSURE: 66 MMHG | TEMPERATURE: 98 F | RESPIRATION RATE: 17 BRPM

## 2024-07-17 LAB
ANION GAP SERPL CALC-SCNC: 8 MMOL/L (ref 0–18)
BUN BLD-MCNC: 8 MG/DL (ref 9–23)
CALCIUM BLD-MCNC: 8.6 MG/DL (ref 8.7–10.4)
CHLORIDE SERPL-SCNC: 108 MMOL/L (ref 98–112)
CO2 SERPL-SCNC: 21 MMOL/L (ref 21–32)
CREAT BLD-MCNC: 0.63 MG/DL
EGFRCR SERPLBLD CKD-EPI 2021: 122 ML/MIN/1.73M2 (ref 60–?)
ERYTHROCYTE [DISTWIDTH] IN BLOOD BY AUTOMATED COUNT: 11.9 %
GLUCOSE BLD-MCNC: 118 MG/DL (ref 70–99)
HCT VFR BLD AUTO: 36 %
HCT VFR BLD AUTO: 37.1 %
HGB BLD-MCNC: 12.6 G/DL
HGB BLD-MCNC: 13 G/DL
MCH RBC QN AUTO: 31.6 PG (ref 26–34)
MCHC RBC AUTO-ENTMCNC: 35 G/DL (ref 31–37)
MCV RBC AUTO: 90 FL
OSMOLALITY SERPL CALC.SUM OF ELEC: 283 MOSM/KG (ref 275–295)
PLATELET # BLD AUTO: 223 10(3)UL (ref 150–450)
POTASSIUM SERPL-SCNC: 4.1 MMOL/L (ref 3.5–5.1)
RBC # BLD AUTO: 4.12 X10(6)UL
SODIUM SERPL-SCNC: 137 MMOL/L (ref 136–145)
WBC # BLD AUTO: 9.9 X10(3) UL (ref 4–11)

## 2024-07-17 PROCEDURE — 44970 LAPAROSCOPY APPENDECTOMY: CPT | Performed by: STUDENT IN AN ORGANIZED HEALTH CARE EDUCATION/TRAINING PROGRAM

## 2024-07-17 RX ORDER — KETOROLAC TROMETHAMINE 30 MG/ML
INJECTION, SOLUTION INTRAMUSCULAR; INTRAVENOUS AS NEEDED
Status: DISCONTINUED | OUTPATIENT
Start: 2024-07-17 | End: 2024-07-17 | Stop reason: SURG

## 2024-07-17 RX ORDER — MIDAZOLAM HYDROCHLORIDE 1 MG/ML
1 INJECTION INTRAMUSCULAR; INTRAVENOUS EVERY 5 MIN PRN
Status: DISCONTINUED | OUTPATIENT
Start: 2024-07-17 | End: 2024-07-17 | Stop reason: HOSPADM

## 2024-07-17 RX ORDER — ONDANSETRON 2 MG/ML
4 INJECTION INTRAMUSCULAR; INTRAVENOUS EVERY 6 HOURS PRN
Status: DISCONTINUED | OUTPATIENT
Start: 2024-07-17 | End: 2024-07-17 | Stop reason: HOSPADM

## 2024-07-17 RX ORDER — CEFAZOLIN SODIUM 1 G/3ML
INJECTION, POWDER, FOR SOLUTION INTRAMUSCULAR; INTRAVENOUS AS NEEDED
Status: DISCONTINUED | OUTPATIENT
Start: 2024-07-17 | End: 2024-07-17 | Stop reason: SURG

## 2024-07-17 RX ORDER — ONDANSETRON 2 MG/ML
INJECTION INTRAMUSCULAR; INTRAVENOUS AS NEEDED
Status: DISCONTINUED | OUTPATIENT
Start: 2024-07-17 | End: 2024-07-17 | Stop reason: SURG

## 2024-07-17 RX ORDER — DIPHENHYDRAMINE HYDROCHLORIDE 50 MG/ML
12.5 INJECTION INTRAMUSCULAR; INTRAVENOUS AS NEEDED
Status: DISCONTINUED | OUTPATIENT
Start: 2024-07-17 | End: 2024-07-17 | Stop reason: HOSPADM

## 2024-07-17 RX ORDER — HYDROCODONE BITARTRATE AND ACETAMINOPHEN 5; 325 MG/1; MG/1
1 TABLET ORAL ONCE AS NEEDED
Status: DISCONTINUED | OUTPATIENT
Start: 2024-07-17 | End: 2024-07-17 | Stop reason: HOSPADM

## 2024-07-17 RX ORDER — PROCHLORPERAZINE EDISYLATE 5 MG/ML
5 INJECTION INTRAMUSCULAR; INTRAVENOUS EVERY 8 HOURS PRN
Status: DISCONTINUED | OUTPATIENT
Start: 2024-07-17 | End: 2024-07-17 | Stop reason: HOSPADM

## 2024-07-17 RX ORDER — SODIUM CHLORIDE, SODIUM LACTATE, POTASSIUM CHLORIDE, CALCIUM CHLORIDE 600; 310; 30; 20 MG/100ML; MG/100ML; MG/100ML; MG/100ML
INJECTION, SOLUTION INTRAVENOUS CONTINUOUS
Status: DISCONTINUED | OUTPATIENT
Start: 2024-07-17 | End: 2024-07-17 | Stop reason: HOSPADM

## 2024-07-17 RX ORDER — SODIUM CHLORIDE, SODIUM LACTATE, POTASSIUM CHLORIDE, CALCIUM CHLORIDE 600; 310; 30; 20 MG/100ML; MG/100ML; MG/100ML; MG/100ML
INJECTION, SOLUTION INTRAVENOUS CONTINUOUS PRN
Status: DISCONTINUED | OUTPATIENT
Start: 2024-07-16 | End: 2024-07-17 | Stop reason: SURG

## 2024-07-17 RX ORDER — LIDOCAINE HYDROCHLORIDE 10 MG/ML
INJECTION, SOLUTION EPIDURAL; INFILTRATION; INTRACAUDAL; PERINEURAL AS NEEDED
Status: DISCONTINUED | OUTPATIENT
Start: 2024-07-17 | End: 2024-07-17 | Stop reason: SURG

## 2024-07-17 RX ORDER — ACETAMINOPHEN 500 MG
1000 TABLET ORAL ONCE AS NEEDED
Status: DISCONTINUED | OUTPATIENT
Start: 2024-07-17 | End: 2024-07-17 | Stop reason: HOSPADM

## 2024-07-17 RX ORDER — HYDROMORPHONE HYDROCHLORIDE 1 MG/ML
0.2 INJECTION, SOLUTION INTRAMUSCULAR; INTRAVENOUS; SUBCUTANEOUS EVERY 5 MIN PRN
Status: DISCONTINUED | OUTPATIENT
Start: 2024-07-17 | End: 2024-07-17 | Stop reason: HOSPADM

## 2024-07-17 RX ORDER — ROCURONIUM BROMIDE 10 MG/ML
INJECTION, SOLUTION INTRAVENOUS AS NEEDED
Status: DISCONTINUED | OUTPATIENT
Start: 2024-07-17 | End: 2024-07-17 | Stop reason: SURG

## 2024-07-17 RX ORDER — MIDAZOLAM HYDROCHLORIDE 1 MG/ML
INJECTION INTRAMUSCULAR; INTRAVENOUS AS NEEDED
Status: DISCONTINUED | OUTPATIENT
Start: 2024-07-16 | End: 2024-07-17 | Stop reason: SURG

## 2024-07-17 RX ORDER — GLYCOPYRROLATE 0.2 MG/ML
INJECTION, SOLUTION INTRAMUSCULAR; INTRAVENOUS AS NEEDED
Status: DISCONTINUED | OUTPATIENT
Start: 2024-07-17 | End: 2024-07-17 | Stop reason: SURG

## 2024-07-17 RX ORDER — HYDROCODONE BITARTRATE AND ACETAMINOPHEN 5; 325 MG/1; MG/1
2 TABLET ORAL ONCE AS NEEDED
Status: DISCONTINUED | OUTPATIENT
Start: 2024-07-17 | End: 2024-07-17 | Stop reason: HOSPADM

## 2024-07-17 RX ORDER — BUPIVACAINE HYDROCHLORIDE 2.5 MG/ML
INJECTION, SOLUTION EPIDURAL; INFILTRATION; INTRACAUDAL AS NEEDED
Status: DISCONTINUED | OUTPATIENT
Start: 2024-07-17 | End: 2024-07-17 | Stop reason: HOSPADM

## 2024-07-17 RX ORDER — HYDROMORPHONE HYDROCHLORIDE 1 MG/ML
0.6 INJECTION, SOLUTION INTRAMUSCULAR; INTRAVENOUS; SUBCUTANEOUS EVERY 5 MIN PRN
Status: DISCONTINUED | OUTPATIENT
Start: 2024-07-17 | End: 2024-07-17 | Stop reason: HOSPADM

## 2024-07-17 RX ORDER — NEOSTIGMINE METHYLSULFATE 1 MG/ML
INJECTION INTRAVENOUS AS NEEDED
Status: DISCONTINUED | OUTPATIENT
Start: 2024-07-17 | End: 2024-07-17 | Stop reason: SURG

## 2024-07-17 RX ORDER — HYDROCODONE BITARTRATE AND ACETAMINOPHEN 5; 325 MG/1; MG/1
1 TABLET ORAL EVERY 6 HOURS PRN
Qty: 15 TABLET | Refills: 0 | Status: SHIPPED | OUTPATIENT
Start: 2024-07-17 | End: 2024-07-30

## 2024-07-17 RX ORDER — NALOXONE HYDROCHLORIDE 0.4 MG/ML
0.08 INJECTION, SOLUTION INTRAMUSCULAR; INTRAVENOUS; SUBCUTANEOUS AS NEEDED
Status: DISCONTINUED | OUTPATIENT
Start: 2024-07-17 | End: 2024-07-17 | Stop reason: HOSPADM

## 2024-07-17 RX ORDER — DEXAMETHASONE SODIUM PHOSPHATE 4 MG/ML
VIAL (ML) INJECTION AS NEEDED
Status: DISCONTINUED | OUTPATIENT
Start: 2024-07-17 | End: 2024-07-17 | Stop reason: SURG

## 2024-07-17 RX ORDER — MEPERIDINE HYDROCHLORIDE 25 MG/ML
12.5 INJECTION INTRAMUSCULAR; INTRAVENOUS; SUBCUTANEOUS AS NEEDED
Status: DISCONTINUED | OUTPATIENT
Start: 2024-07-17 | End: 2024-07-17 | Stop reason: HOSPADM

## 2024-07-17 RX ORDER — HYDROMORPHONE HYDROCHLORIDE 1 MG/ML
0.4 INJECTION, SOLUTION INTRAMUSCULAR; INTRAVENOUS; SUBCUTANEOUS EVERY 5 MIN PRN
Status: DISCONTINUED | OUTPATIENT
Start: 2024-07-17 | End: 2024-07-17 | Stop reason: HOSPADM

## 2024-07-17 RX ADMIN — SODIUM CHLORIDE, SODIUM LACTATE, POTASSIUM CHLORIDE, CALCIUM CHLORIDE: 600; 310; 30; 20 INJECTION, SOLUTION INTRAVENOUS at 00:56:00

## 2024-07-17 RX ADMIN — ROCURONIUM BROMIDE 30 MG: 10 INJECTION, SOLUTION INTRAVENOUS at 00:09:00

## 2024-07-17 RX ADMIN — CEFAZOLIN SODIUM 2 G: 1 INJECTION, POWDER, FOR SOLUTION INTRAMUSCULAR; INTRAVENOUS at 00:21:00

## 2024-07-17 RX ADMIN — NEOSTIGMINE METHYLSULFATE 4 MG: 1 INJECTION INTRAVENOUS at 00:57:00

## 2024-07-17 RX ADMIN — LIDOCAINE HYDROCHLORIDE 50 MG: 10 INJECTION, SOLUTION EPIDURAL; INFILTRATION; INTRACAUDAL; PERINEURAL at 00:02:00

## 2024-07-17 RX ADMIN — GLYCOPYRROLATE 0.6 MG: 0.2 INJECTION, SOLUTION INTRAMUSCULAR; INTRAVENOUS at 00:57:00

## 2024-07-17 RX ADMIN — DEXAMETHASONE SODIUM PHOSPHATE 4 MG: 4 MG/ML VIAL (ML) INJECTION at 00:09:00

## 2024-07-17 RX ADMIN — ONDANSETRON 4 MG: 2 INJECTION INTRAMUSCULAR; INTRAVENOUS at 00:09:00

## 2024-07-17 RX ADMIN — KETOROLAC TROMETHAMINE 30 MG: 30 INJECTION, SOLUTION INTRAMUSCULAR; INTRAVENOUS at 00:53:00

## 2024-07-17 NOTE — PROGRESS NOTES
Notified Luz Elena Henriquez PA-C, that the patient complained of having some dizziness, blood pressure checked, and reading was 119/66. Stat Hemoglobin and Hematocrit ordered by REYNALDO.

## 2024-07-17 NOTE — ANESTHESIA PREPROCEDURE EVALUATION
PRE-OP EVALUATION    Patient Name: Adrianna Mcdonald    Admit Diagnosis: Acute appendicitis with localized peritonitis, without perforation, abscess, or gangrene [K35.30]    Pre-op Diagnosis: Appendicitis [K37]    LAPAROSCOPIC APPENDECTOMY    Anesthesia Procedure: LAPAROSCOPIC APPENDECTOMY (Abdomen)    Surgeons and Role:     * Luke Juarez MD - Primary    Pre-op vitals reviewed.  Temp: 98.2 °F (36.8 °C)  Pulse: 72  Resp: 16  BP: 119/53  SpO2: 99 %  Body mass index is 30.9 kg/m².    Current medications reviewed.  Hospital Medications:   [COMPLETED] sodium chloride 0.9% infusion 1,000 mL  1,000 mL Intravenous Once    [COMPLETED] iopamidol 76% (ISOVUE-370) injection for power injector  100 mL Intravenous ONCE PRN    sodium chloride 0.9% infusion   Intravenous Continuous    [] sodium chloride 0.9% infusion   Intravenous Continuous    acetaminophen (Tylenol) tab 650 mg  650 mg Oral Q4H PRN    Or    HYDROcodone-acetaminophen (Norco) 5-325 MG per tab 1 tablet  1 tablet Oral Q4H PRN    Or    HYDROcodone-acetaminophen (Norco) 5-325 MG per tab 2 tablet  2 tablet Oral Q4H PRN    heparin (Porcine) 5000 UNIT/ML injection 5,000 Units  5,000 Units Subcutaneous Q8H LINDA    HYDROmorphone (Dilaudid) 1 MG/ML injection 0.2 mg  0.2 mg Intravenous Q2H PRN    Or    HYDROmorphone (Dilaudid) 1 MG/ML injection 0.4 mg  0.4 mg Intravenous Q2H PRN    Or    HYDROmorphone (Dilaudid) 1 MG/ML injection 0.8 mg  0.8 mg Intravenous Q2H PRN    acetaminophen (Ofirmev) 10 mg/mL infusion premix 1,000 mg  1,000 mg Intravenous Q6H    ondansetron (Zofran) 4 MG/2ML injection 4 mg  4 mg Intravenous Q6H PRN    prochlorperazine (Compazine) 10 MG/2ML injection 5 mg  5 mg Intravenous Q8H PRN       Outpatient Medications:     Medications Prior to Admission   Medication Sig Dispense Refill Last Dose    Levonorgestrel (MIRENA, 52 MG,) 20 MCG/24HR Intrauterine IUD 20 mcg (1 each total) by Intrauterine route once.   Taking       Allergies: Patient has no  known allergies.      Anesthesia Evaluation    Patient summary reviewed.    Anesthetic Complications  (-) history of anesthetic complications         GI/Hepatic/Renal    Negative GI/hepatic/renal ROS.                             Cardiovascular      ECG reviewed.  Exercise tolerance: good     MET: >4         (+) hyperlipidemia                                  Endo/Other    Negative endo/other ROS.                              Pulmonary    Negative pulmonary ROS.                       Neuro/Psych                                  Past Surgical History:   Procedure Laterality Date    Mirena, iud, 5 year  16      15     Social History     Socioeconomic History    Marital status: Single   Tobacco Use    Smoking status: Never    Smokeless tobacco: Never   Vaping Use    Vaping status: Never Used   Substance and Sexual Activity    Alcohol use: Yes     Alcohol/week: 0.0 standard drinks of alcohol     Comment: social    Drug use: Not Currently    Sexual activity: Yes     Partners: Male     Birth control/protection: Mirena     History   Drug Use Unknown     Available pre-op labs reviewed.  Lab Results   Component Value Date    MCV 91.6 2024    MCHC 33.6 2024               Airway      Mallampati: III  Mouth opening: >3 FB  TM distance: > 6 cm  Neck ROM: full Cardiovascular    Cardiovascular exam normal.  Rhythm: regular  Rate: normal     Dental             Pulmonary    Pulmonary exam normal.  Breath sounds clear to auscultation bilaterally.               Other findings            ASA: 2   Plan: general  NPO status verified and patient meets guidelines.    Post-procedure pain management plan discussed with surgeon and patient.      Plan/risks discussed with: patient and mother  Use of blood product(s) discussed with: patient and mother    Consented to blood products.        Present on Admission:  **None**

## 2024-07-17 NOTE — ANESTHESIA POSTPROCEDURE EVALUATION
[facility]    Adrianna Valdez-Lane Patient Status:  Observation   Age/Gender 29 year old female MRN [id number]   Location [facility] POST ANESTHESIA CARE UNIT Attending Luke Juarez MD   Hosp Day # 0 PCP Do Trevizo MD       Anesthesia Post-op Note    LAPAROSCOPIC APPENDECTOMY    Procedure Summary       Date: 07/16/24 Room / Location:  MAIN OR 08 /  MAIN OR    Anesthesia Start: 2355 Anesthesia Stop: 07/17/24 0117    Procedure: LAPAROSCOPIC APPENDECTOMY (Abdomen) Diagnosis:       Appendicitis      (Appendicitis [K37])    Surgeons: Luke Juarez MD Anesthesiologist: Erika Rivas MD    Anesthesia Type: general ASA Status: 2            Anesthesia Type: general    Vitals Value Taken Time   /61 07/17/24 0118   Temp 99.6 °F (37.6 °C) 07/17/24 0113   Pulse 72 07/17/24 0121   Resp 19 07/17/24 0121   SpO2 93 % 07/17/24 0121   Vitals shown include unfiled device data.    Patient Location: PACU    Anesthesia Type: general    Airway Patency: patent and extubated    Postop Pain Control: adequate    Mental Status: preanesthetic baseline    Nausea/Vomiting: none    Cardiopulmonary/Hydration status: stable euvolemic    Complications: no apparent anesthesia related complications    Postop vital signs: stable    Dental Exam: Unchanged from Preop    Patient to be discharged from PACU when criteria met.

## 2024-07-17 NOTE — TELEPHONE ENCOUNTER
Mukul Washington APRN APN  Specialty: Nurse Practitioner Family     ED Provider Notes     Signed     Date of Service: 2024 11:45 AM     Signed       Expand All Collapse All       Patient Seen in: [facility]        History          Chief Complaint   Patient presents with    Nausea/vomiting      Stated Complaint: vomit; diarrhea        Subjective:  HPI     29-year-old female presents to the immediate care with complaints of vomiting and diarrhea that started this morning had 2 episodes of diarrhea but just has generalized right lower abdominal pain.  Pt states the pain is waxing and waning.  Denies any previous surgical history.  Patient does have an IUD in.  No previous surgical history on her abdomen.  No fever.  No recent travel no recent antibiotic use.  Patient has no other issues complaints or concerns.           Objective:  History reviewed. No pertinent past medical history.                     Past Surgical History:   Procedure Laterality Date    Mirena, iud, 4 year   16       15                      Social History            Socioeconomic History    Marital status: Single   Tobacco Use    Smoking status: Never    Smokeless tobacco: Never   Vaping Use    Vaping status: Never Used   Substance and Sexual Activity    Alcohol use: Yes       Alcohol/week: 0.0 standard drinks of alcohol       Comment: social    Drug use: Not Currently    Sexual activity: Yes       Partners: Male       Birth control/protection: Mirena                   Review of Systems     Positive for stated Chief Complaint: Nausea/vomiting     Other systems are as noted in HPI.  Constitutional and vital signs reviewed.       All other systems reviewed and negative except as noted above.     Physical Exam          ED Triage Vitals [24 1020]   BP (!) 158/95   Pulse 94   Resp 16   Temp 98 °F (36.7 °C)   Temp src Temporal   SpO2 99 %   O2 Device None (Room air)         Current Vitals:   Vital Signs  BP:  129/90  Pulse: 96  Resp: 16  Temp: 98 °F (36.7 °C)  Temp src: Temporal     Oxygen Therapy  SpO2: 100 %  O2 Device: None (Room air)                 Physical Exam     GENERAL: well developed, well nourished, in distress and in pain: no  SKIN: no rashes, no suspicious lesions  Head: Normocephalic and atraumatic   Eyes: PERRLA+, EOMI+.  Conjunctiva normal.  Cornea clear.  Ears: normal pending membranes.  Normal external auditory canals   Nose: Nasal mucosa.  No sinus tenderness.  No nasal congestion.    Throat: Oropharynx noninjected.  No lip, tongue, throat swelling. Buccal mucosa moist: yes  EYES: PERRLA, EOMI, normal optic disk,conjunctiva are clear  NECK: supple, no adenopathy, no bruits  CHEST: no chest tenderness  LUNGS: clear to auscultation  CARDIO: RRR without murmur  GI: soft, non distended. No visible peristalsis. No masses. No organomegaly. Tenderness in RLQ. Bowel sounds: Normal active x 4. Guarding : no. Rigidity: no.   NEURO: Oriented times three, cranial nerves are intact, motor and sensory are grossly intact        ED Course            Labs Reviewed   POCT CBC - Abnormal; Notable for the following components:       Result Value      # Neutrophil 9.0 (*)       All other components within normal limits   EM POCT URINALYSIS DIPSTICK - Abnormal; Notable for the following components:     Blood, Urine Small (*)       Leukocyte esterase urine Small (*)       All other components within normal limits   POCT PREGNANCY URINE - Normal   POCT ISTAT CHEM8 CARTRIDGE - Normal      CT APPENDIX ABD/PEL W CONTRAST (CPT=74177)     Result Date: 7/16/2024  PROCEDURE:  CT APPENDIX ABD/PEL W CONTRAST (CPT=74177)  COMPARISON:  None.  INDICATIONS:  RLQ pain  TECHNIQUE:  Axial helical acquisitions are obtained from through the abdomen and pelvis after bolus intravenous nonionic contrast administration.  Images of the right lower quadrant reconstructed at 2.5 mm. Coronal MPR imaging was obtained.  Dose reduction techniques were  used. Dose information is transmitted to the ACR (American College of Radiology) NRDR (National Radiology Data Registry) which includes the Dose Index Registry.  PATIENT STATED HISTORY:(As transcribed by Technologist)  Patient states she has had mid abdomen pain, nausea and vomiting since this morning.   CONTRAST USED:  100cc of Isovue 370  FINDINGS:  APPENDIX:  The appendix is slightly prominent in size measuring 8 mm with mucosal enhancement and a 3 mm appendicolith distally.  No significant periappendiceal inflammatory changes but there is trace fluid.  Findings concerning for early acute appendicitis. LIVER:  No enlargement, atrophy, abnormal density, or significant focal lesion.  BILIARY:  No visible dilatation or calcification.  PANCREAS:  No lesion, fluid collection, ductal dilatation, or atrophy.  SPLEEN:  No enlargement or focal lesion.  KIDNEYS:  No mass, obstruction, or calcification.  ADRENALS:  No mass or enlargement.  AORTA/VASCULAR:  No aneurysm.  RETROPERITONEUM:  No mass or adenopathy.  BOWEL/MESENTERY:  Normal caliber small and large bowel.  Fluid contents predominantly within the colon, correlate clinically.  ABDOMINAL WALL:  No mass or hernia.  URINARY BLADDER:  The bladder is not well distended may account for diffuse bladder wall thickening, correlate clinically with cystitis.  No visible focal wall thickening, lesion, or calculus.  PELVIC NODES:  No adenopathy.  PELVIC ORGANS:  Intrauterine device.  Pelvic organs appropriate for patient age.  Trace free pelvic fluid is predominantly adjacent to a small, 1.5 cm left adnexal low-density lesion with peripheral enhancement may represent a resolving cyst. BONES:  No bony lesion or fracture.  LUNG BASES:  No visible pulmonary or pleural disease.              CONCLUSION:  1. Findings concerning for early acute appendicitis as detailed above.   LOCATION:  [facility]   Dictated by (CST): Pattie Pal MD on 7/16/2024 at 11:16 AM     Finalized by (CST): Kae  MD Pattie on 7/16/2024 at 11:22 AM         This case was discussed with my supervising physician, Dr. Rodriguez via  telephone.  We discussed the clinical presentation, my exam, testing methodology and agreed on plan of care.  Dr. Phan from general surgery was consulted in regards to patient's CT findings.  She agrees that patient should be sent to the ER  MDM   Patient is to go to the ER for further evaluation of acute appendicitis patient is to go via POV patient is in stable condition vital signs are reassuring.  Patient was advised not to eat or drink anything until evaluated by the ER staff                    Medical Decision Making        Disposition and Plan      Clinical Impression:  1. Colicky RLQ abdominal pain    2. Acute appendicitis, unspecified acute appendicitis type          Disposition:  Ic to ed  7/16/2024 12:32 pm     Follow-up:  [facility] Emergency Department  [address] [place] [id number]  [phone]  Today                 Medications Prescribed:  Discharge Medication List as of 7/16/2024 12:33 PM                                                                       Electronically signed by Mukul Washington APRN at 7/16/2024 12:52 PM         [facility] on 7/16/2024            Detailed Report          Note shared with patient  Additional Orders and Documentation      Results  Imaging         Meds           Orders           Flowsheets      Encounter Info: History,     Allergies,     Detailed Report     Chart Review: Note Routing History    No routing history on file.  Clinical Impressions      Colicky RLQ abdominal pain    Acute appendicitis, unspecified acute appendicitis type  Disposition      IC to ED       ED/IC AVS (Automatic SnapShot taken 7/16/2024)      Follow-Ups: Follow up with [facility] Emergency Department (Emergency Medicine) on 7/16/2024  Medication Changes      None  Medication List at Discharge  Care Timeline    1012   Arrived   1027   POCT Urinalysis Dipstick     1029   POCT Pregnancy, Urine   1049   POCT CBC    1050   Sodium Chloride 1000 mL   1052   POCT ISTAT chem8 cartridge   1105   Iopamidol 100 mL   1109   CT APPENDIX ABD/PEL W CONTRAST (CPT=74177)   1233   Discharged      Mukul Washington APRN APN  Specialty: Nurse Practitioner Family     Discharge Instructions  Written     Date of Service: 2024 12:32 PM     Written         Please go directly to the [facility] ER for further evaluation do not stop to eat or drink anything until evaluated by the ER staff            Electronically signed by Mukul Washington APRN at 2024 12:32 PM         [facility] on 2024            Detailed Report       Chart Review: Note Routing History    No routing history on file.  Clinical Impressions      Colicky RLQ abdominal pain    Acute appendicitis, unspecified acute appendicitis type  Disposition      IC to ED       ED/IC AVS (Automatic SnapShot taken 2024)      Follow-Ups: Follow up with [facility] Emergency Department (Emergency Medicine) on 2024  Care Timeline    1012   Arrived   1027   POCT Urinalysis Dipstick    1029   POCT Pregnancy, Urine   1049   POCT CBC    1050   Sodium Chloride 1000 mL   1052   POCT ISTAT chem8 cartridge   1105   Iopamidol 100 mL   1109   CT APPENDIX ABD/PEL W CONTRAST (CPT=74177)   1233   Discharged      [facility]  Department of Radiology  [address] [place] [address] [address] [place] [id number] [phone]      Name: Adrianna Mcdonald Dr: STANLEY Harmon  : 1995    Age/Sex: 29 year old Female  Pt. Phone: [phone]  Exam Date: 2024  Procedure: CT APPENDIX ABD/PEL W CONTRAST (CPT=74177)   -----------------------------------------------------------------------------------------------------------------------------------------------                  Mukul Washington, APRN  [address] [facility] [address]  [facility] [id number]      Interpretation   PROCEDURE:  CT APPENDIX ABD/PEL W CONTRAST  (CPT=74177)     COMPARISON:  None.     INDICATIONS:  RLQ pain     TECHNIQUE:  Axial helical acquisitions are obtained from through the abdomen and pelvis after bolus intravenous nonionic contrast administration.  Images of the right lower quadrant reconstructed at 2.5 mm. Coronal MPR imaging was obtained.  Dose   reduction techniques were used. Dose information is transmitted to the ACR (American College of Radiology) NRDR (National Radiology Data Registry) which includes the Dose Index Registry.      PATIENT STATED HISTORY:(As transcribed by Technologist)  Patient states she has had mid abdomen pain, nausea and vomiting since this morning.       CONTRAST USED:  100cc of Isovue 370     FINDINGS:    APPENDIX:  The appendix is slightly prominent in size measuring 8 mm with mucosal enhancement and a 3 mm appendicolith distally.  No significant periappendiceal inflammatory changes but there is trace fluid.  Findings concerning for early acute   appendicitis.  LIVER:  No enlargement, atrophy, abnormal density, or significant focal lesion.    BILIARY:  No visible dilatation or calcification.    PANCREAS:  No lesion, fluid collection, ductal dilatation, or atrophy.    SPLEEN:  No enlargement or focal lesion.    KIDNEYS:  No mass, obstruction, or calcification.    ADRENALS:  No mass or enlargement.    AORTA/VASCULAR:  No aneurysm.    RETROPERITONEUM:  No mass or adenopathy.    BOWEL/MESENTERY:  Normal caliber small and large bowel.  Fluid contents predominantly within the colon, correlate clinically.    ABDOMINAL WALL:  No mass or hernia.    URINARY BLADDER:  The bladder is not well distended may account for diffuse bladder wall thickening, correlate clinically with cystitis.  No visible focal wall thickening, lesion, or calculus.    PELVIC NODES:  No adenopathy.    PELVIC ORGANS:  Intrauterine device.  Pelvic organs appropriate for patient age.  Trace free pelvic fluid is predominantly adjacent to a small, 1.5 cm left  adnexal low-density lesion with peripheral enhancement may represent a resolving cyst.  BONES:  No bony lesion or fracture.    LUNG BASES:  No visible pulmonary or pleural disease.                     =====  CONCLUSION:    1. Findings concerning for early acute appendicitis as detailed above.        LOCATION:  [facility]        Dictated by (CST): Pattie Pal MD on 7/16/2024 at 11:16 AM       Finalized by (CST): Pattie Pal MD on 7/16/2024 at 11:22 AM            This letter (including any attachments) contains confidential information intended only for the addressee.  Any use or disclosure by any other person is unlawful.  If you are not the intended recipient, please notify our department immediately at [phone] and we will make arrangements for the return of the information to [facility].  You are hereby notified that any disclosure, copying, or distribution of this letter, or the taking of any action based on it, is strictly prohibited by law.

## 2024-07-17 NOTE — PLAN OF CARE
Alert and oriented x4, VSS, tolerating RA, pain rated 2-3/10 mildly, declining pain medications. Denies nausea, chest pain, dyspnea, lightheadedness, denies calf pain, up independently after setup to void in bathroom. NPO for surgery scheduled tonight.   Consent signed and on chart.     0210  Back from PACU, c/o 6/10 sore+sharp pain, Dilaudid given with relief, educated patient of importance of return of GI function and goals with pain management regimen

## 2024-07-17 NOTE — PROGRESS NOTES
NURSING ADMISSION NOTE      Patient admitted via Cart from PACU  Oriented to room.  Safety precautions initiated.  Bed in low position.  Call light in reach.    Alert and oriented x4.  Family member at bedside.    Abdominal incisions CDI, due to void

## 2024-07-17 NOTE — ANESTHESIA PROCEDURE NOTES
Airway  Date/Time: 7/17/2024 12:02 AM  Urgency: elective      General Information and Staff    Patient location during procedure: OR  Anesthesiologist: Erika Rivas MD  Performed: anesthesiologist   Performed by: Erika Rivas MD  Authorized by: Erika Rivas MD      Indications and Patient Condition  Indications for airway management: anesthesia  Spontaneous Ventilation: absent  Sedation level: deep  Preoxygenated: yes  Patient position: sniffing  Mask difficulty assessment: 0 - not attempted    Final Airway Details  Final airway type: endotracheal airway      Successful airway: ETT  Cuffed: yes   Successful intubation technique: direct laryngoscopy  Facilitating devices/methods: cricoid pressure and rapid sequence intubation  Endotracheal tube insertion site: oral  Blade: Garcia  Blade size: #2  ETT size (mm): 7.0    Cormack-Lehane Classification: grade I - full view of glottis  Placement verified by: capnometry   Cuff volume (mL): 7  Measured from: lips  ETT to lips (cm): 20  Number of attempts at approach: 1  Number of other approaches attempted: 0    Additional Comments  Smooth induction, rapid sequence intubation, no trauma to teeth

## 2024-07-17 NOTE — PROGRESS NOTES
[facility]  Progress Note    Adrianna Mcdonald Patient Status:  Observation    1995 MRN [id number]   Location [facility] 3NW-A Attending Luke Juarez MD   Hosp Day # 0 PCP Do Trevizo MD     Subjective:  The patient was seen and examined at bedside.  No acute events overnight.  She states she is sore this morning, but the pain she was experiencing prior to her operation has resolved.  She has had water since her operation without nausea or vomiting.  She denies passing flatus or having a bowel movement.    Objective/Physical Exam:  /72 (BP Location: Right arm)   Pulse 78   Temp 98.1 °F (36.7 °C) (Oral)   Resp 17   Ht 64\"   Wt 180 lb (81.6 kg)   LMP  (LMP Unknown)   SpO2 96%   BMI 30.90 kg/m²     Intake/Output Summary (Last 24 hours) at 2024 0753  Last data filed at 2024 0650  Gross per 24 hour   Intake 2375 ml   Output 480 ml   Net 1895 ml         General: Alert, oriented x3. No acute distress.  HEENT: Normocephalic, atraumatic. No scleral icterus.  Pulmonary: No respiratory distress, effort normal.   Abdomen: Non-distended, without tympany to percussion. Soft, incisional site tenderness to palpation. No rebound or guarding. No peritoneal signs.   Incision: Laparoscopic incision sites are clean, dry, intact without surrounding erythema or cellulitis.  Skin glue is in place.  Extremities: No lower extremity edema. No clubbing or cyanosis.   Skin: Warm, dry. No jaundice.       Labs:  Lab Results   Component Value Date    WBC 9.9 2024    HGB 13.0 2024    HCT 37.1 2024    .0 2024      No results found for: \"PT\", \"INR\"  Lab Results   Component Value Date     2024    K 4.1 2024     2024    CO2 21.0 2024    BUN 8 2024    CREATSERUM 0.63 2024     2024    CA 8.6 2024          Assessment:  Patient Active Problem List   Diagnosis    IUD check up    Family history of diabetes  mellitus in mother    Mixed hyperlipidemia    Class 1 obesity without serious comorbidity with body mass index (BMI) of 31.0 to 31.9 in adult    History of pregnancy induced hypertension    Colicky RLQ abdominal pain    Acute appendicitis with localized peritonitis, without perforation, abscess, or gangrene     POD 0 laparoscopic appendectomy    Plan:  Overall, the patient is doing well postoperatively.  Plan for discharge home today  General diet  Recommend ibuprofen and Tylenol primarily for pain management.  Will send Norco for breakthrough pain  Activity restrictions reviewed.  No lifting greater than 5 to 10 pounds until follow-up  Provided the patient with a return to work note  DVT prophylaxis with heparin  Follow-up with [facility] surgery in 2 weeks       My total face time with this patient was 25 minutes. Greater than half of the visit was spent in counseling the patient on the above listed diagnoses and treatment options.     Luz Elena Henriquez PA-C  7/17/2024  7:53 AM

## 2024-07-17 NOTE — PROGRESS NOTES
Notified Luz Elena Henriquez PA-C, that the patient's Hemoglobin Level is 12.6 and her Hematocrit Level is 36.0. REYNALDO stated that the patient can be discharged home, but she needs to follow up with her Primary Care Physician due to her report of dizziness. Patient notified that REYNALDO stated that she needs to follow up with her Primary Care Physician due to her report of dizziness, and the patient verbalized her understanding. Patient discharged to home. The patient was taken down to the [facility] by wheelchair by her Patient Care Technician and she has a ride home.

## 2024-07-17 NOTE — PROGRESS NOTES
This is a very nice 29-year-old female with symptoms, signs and workup findings suggestive of early acute appendicitis.  Patient was previously evaluated by my partner, Dr. Phan.  Dr. Phan recommended scheduling laparoscopic appendectomy, possible open.  Dr. Phan asked me to perform this procedure as there was no OR availability prior to 5 PM today.     I met the patient in the preoperative holding area.  The details of surgery were discussed including the expected recovery time, risks, benefits and alternatives.  Patient expressed understanding and was agreeable to proceed with surgery.  Consent was signed.  All questions answered.     Luke Juarez MD  [facility]

## 2024-07-17 NOTE — OPERATIVE REPORT
[facility]  Operative Note    Adrianna Mcdonald Location: OR   [facility] [id number] MRN [id number]    1995 Age 29 year old   Admission Date 2024 Operation Date 2024   Attending Physician Luke Juarez MD Operating Physician Luke Juarez MD   PCP Do Trevizo MD          Patient Name: Adrianna Mcdonald    Preoperative Diagnosis: Appendicitis [K37]    Postoperative Diagnosis: Acute appendicitis    Primary Surgeon: Luke uJarez MD    Assistant: None    Anesthesia: General    Procedures: Laparoscopic appendectomy    Implants: None    Specimen: Appendix    Drains: None    Estimated Blood Loss: 5 cc    Complications: None immediate    Condition: Stable    Indications for Surgery:   This is a very nice 29-year-old female with symptoms, signs and work-up findings suggestive of early acute appendicitis.  Patient was previously evaluated by my partner, Dr. Phan.  Dr. Phan recommended scheduling laparoscopic appendectomy, possible open.  Dr. Phan asked me to perform this procedure as there was no OR availability prior to 5 PM today.     I met the patient in the preoperative holding area.  The details of surgery were discussed including the expected recovery time, risks, benefits and alternatives.  Patient expressed understanding and was agreeable to proceed with surgery.  Consent was signed.  All questions answered.    Surgical Findings:   Mildly hyperemic appendix that was otherwise soft and healthy appearing.    Description of Procedure:   Patient was brought to the operating room and laid supine on the OR table. Bilateral sequential compression devices were placed.  Preoperative antibiotics were given.  Patient was induced under general anesthesia.  Patient immediately voided prior to surgery.  Therefore, no Odonnell catheter was placed. The left arm was carefully tucked with all pressure points well-padded.  The abdomen was prepped and draped in the usual sterile fashion.  A timeout was  performed.     I began by making a 5 mm supraumbilical skin incision.  I attempted to establish pneumoperitoneum using a Veress needle at this site.  I was unable to confirm intraperitoneal placement and did not feel comfortable in continuing to push deeper into the tissue at this location.  Therefore, the Veress needle was inserted through a small stab incision made at Espinosa's point.  Pneumoperitoneum was established and there was appropriately low opening pressure.  A 5 mm optical trocar was placed under direct laparoscopic vision through the supraumbilical incision.  The Veress needle was seen free-floating within the peritoneal cavity and was removed.  There was no evidence of underlying visceral injury. A 12 mm left lower quadrant and a 5 mm suprapubic trocar were placed under direct vision.  The patient was positioned right side up and in slight Trendelenburg.     Examination of the omental surface, liver surface and pelvis was unremarkable. I was able to identify a mildly hyperemic otherwise soft and healthy appearing retrocecal appendix beneath the right colon.  There were a few adhesions tethering the appendix to the retroperitoneum.  These were divided with a LigaSure device.  The appendix was grasped and a window was made at the base of the mesoappendix using a [place] dissector.  A 45 mm laparoscopic WARREN stapler with tan load was used to divide the appendix at its base.  The mesoappendix was divided using an LigaSure device. The surgical field was carefully examined and appeared hemostatic.  The appendix was placed in a specimen bag and retrieved through the 12 mm left lateral trocar.  The patient was leveled. The appendix was retrieved within the specimen bag and passed off the field as a specimen.     The fascia at the 12 mm trocar site was closed using a single, interrupted 0 PDS suture with the assistance of a Bernabe-Yonathan device.  The remaining trocars were removed under direct vision and  appeared hemostatic. Pneumoperitoneum was evacuated.  Each skin incision was anesthetized using a total of 30 cc of 0.25% Marcaine.  The skin incisions were closed using interrupted 4-0 Monocryl in a subcuticular fashion.  The skin was cleaned and dried and skin glue was placed over each incision as a final dressing.     Patient was awakened from anesthesia, extubated and transported to the postanesthesia care unit in stable condition.  All sponge, needle and instrument counts were correct at the end of the case.  I was present for the entire case.      Luke Juarez MD  7/17/2024  1:12 AM

## 2024-07-17 NOTE — PROGRESS NOTES
AVS reviewed, IV dc'd, Norco filled here- awaiting delivery to bedside, verbalized understanding of discharge instructions -will dc home when ride arrives, work letter given .

## 2024-07-30 ENCOUNTER — OFFICE VISIT (OUTPATIENT)
Facility: LOCATION | Age: 29
End: 2024-07-30
Payer: MEDICAID

## 2024-07-30 VITALS — TEMPERATURE: 98 F | OXYGEN SATURATION: 97 % | HEART RATE: 69 BPM

## 2024-07-30 DIAGNOSIS — Z98.890 POSTOPERATIVE STATE: Primary | ICD-10-CM

## 2024-07-30 DIAGNOSIS — Z90.49 HISTORY OF APPENDECTOMY: ICD-10-CM

## 2024-07-30 PROCEDURE — 99024 POSTOP FOLLOW-UP VISIT: CPT

## 2024-07-30 NOTE — PROGRESS NOTES
Follow Up Visit Note       Active Problems      1. Postoperative state    2. History of appendectomy          Chief Complaint   Chief Complaint   Patient presents with    Post-Op     PO - LAPAROSCOPIC APPENDECTOMY 24 JJS, discomfort, on right lower abdomen area, no other symptoms.         History of Present Illness  Adrianna is a 29 year old female who underwent laparoscopic appendectomy with Dr. Juarez on 2024. She presents to clinic today for follow up evaluation.    She reports overall doing well postoperatively. She reports continued mild incisional pain. She reports she is no longer taking any pain medication. She is tolerating diet without nausea or vomiting. She denies diarrhea or constipation. She denies urinary symptoms. She denies fever or chills.    Specimen pathology as below:  Appendectomy:  -Mild acute appendicitis and periappendicitis.    Allergies  Adrianna has No Known Allergies.    Past Medical / Surgical / Social / Family History    The past medical and past surgical history have been reviewed by me today.    History reviewed. No pertinent past medical history.  Past Surgical History:   Procedure Laterality Date    Mirena, iud, 5 year  17      16       The family history and social history have been reviewed by me today.    Family History   Problem Relation Age of Onset    Diabetes Mother     Fibromyalgia Mother     Diabetes Maternal Grandmother     High Blood Pressure Maternal Aunt     High Cholesterol Maternal Aunt     Cancer Maternal Uncle         brain     Social History     Socioeconomic History    Marital status: Single   Tobacco Use    Smoking status: Never    Smokeless tobacco: Never   Vaping Use    Vaping status: Never Used   Substance and Sexual Activity    Alcohol use: Yes     Alcohol/week: 0.0 standard drinks of alcohol     Comment: social    Drug use: Not Currently    Sexual activity: Yes     Partners: Male     Birth control/protection: Mirena      No current  outpatient medications on file.     Review of Systems  The Review of Systems has been reviewed by me during today.  Review of Systems   Constitutional:  Negative for activity change, appetite change, chills, fatigue and fever.   Gastrointestinal:  Positive for abdominal pain. Negative for abdominal distention, constipation, diarrhea, nausea and vomiting.        Incisional pain   Genitourinary:  Negative for difficulty urinating, dysuria, hematuria and urgency.   Skin:  Negative for wound.        Physical Findings   Pulse 69   Temp 97.8 °F (36.6 °C) (Temporal)   LMP  (LMP Unknown)   SpO2 97%   Physical Exam  Vitals reviewed.   Constitutional:       General: She is not in acute distress.     Appearance: Normal appearance. She is not ill-appearing.   HENT:      Head: Normocephalic and atraumatic.   Eyes:      General: No scleral icterus.     Conjunctiva/sclera: Conjunctivae normal.   Pulmonary:      Effort: Pulmonary effort is normal. No respiratory distress.   Abdominal:      General: Abdomen is flat. There is no distension.      Palpations: Abdomen is soft.      Tenderness: There is abdominal tenderness. There is no guarding or rebound.      Comments: Abdomen is soft, non-distended, mild incisional tenderness.  Laparoscopic incision x 3 are clean, dry and healing appropriately. No surrounding erythema or drainage. No fluctuance to palpation. No signs of infection. Dermabond in place.     Skin:     General: Skin is warm and dry.      Coloration: Skin is not jaundiced.   Neurological:      Mental Status: She is alert.   Psychiatric:         Mood and Affect: Mood normal.         Behavior: Behavior normal.          Assessment   1. Postoperative state    2. History of appendectomy        Plan   The patient is doing well postoperatively.  Continue Diet as tolerated.  I discussed with the patient that the discomfort she is feeling is consistent muscular pain. I discussed with her that this will improve with time. She may  take Tylenol and Ibuprofen as needed for pain control. Can apply warm or cold compresses for comfort.   Continue local wound care, soap and water to the incisions. Ok to submerge her incision.   Pathology discussed with the patient.   Recommend Lifting restrictions of no greater than 15-20 lbs for 6 weeks postoperatively  All the patient's questions and concerns were addressed. They voiced understanding and are in agreement with the plan   I discussed with the patient should she have persistent left lower quadrant abdominal pain by 6-8 weeks after surgery, she should return to clinic for evaluation.     Follow Up  They may follow up with [facility] on an as-needed basis.      Nina Alves PA-C

## 2024-11-29 ENCOUNTER — OFFICE VISIT (OUTPATIENT)
Dept: FAMILY MEDICINE CLINIC | Facility: CLINIC | Age: 29
End: 2024-11-29
Payer: COMMERCIAL

## 2024-11-29 ENCOUNTER — PATIENT MESSAGE (OUTPATIENT)
Dept: FAMILY MEDICINE CLINIC | Facility: CLINIC | Age: 29
End: 2024-11-29

## 2024-11-29 ENCOUNTER — LAB ENCOUNTER (OUTPATIENT)
Dept: LAB | Age: 29
End: 2024-11-29
Attending: NURSE PRACTITIONER
Payer: COMMERCIAL

## 2024-11-29 VITALS
RESPIRATION RATE: 16 BRPM | BODY MASS INDEX: 31.01 KG/M2 | DIASTOLIC BLOOD PRESSURE: 84 MMHG | SYSTOLIC BLOOD PRESSURE: 116 MMHG | HEIGHT: 64 IN | HEART RATE: 70 BPM | WEIGHT: 181.63 LBS | OXYGEN SATURATION: 99 % | TEMPERATURE: 98 F

## 2024-11-29 DIAGNOSIS — Z11.3 SCREEN FOR STD (SEXUALLY TRANSMITTED DISEASE): ICD-10-CM

## 2024-11-29 DIAGNOSIS — Z87.59 HISTORY OF PREGNANCY INDUCED HYPERTENSION: ICD-10-CM

## 2024-11-29 DIAGNOSIS — E66.09 CLASS 1 OBESITY DUE TO EXCESS CALORIES WITHOUT SERIOUS COMORBIDITY WITH BODY MASS INDEX (BMI) OF 31.0 TO 31.9 IN ADULT: ICD-10-CM

## 2024-11-29 DIAGNOSIS — E66.811 CLASS 1 OBESITY DUE TO EXCESS CALORIES WITHOUT SERIOUS COMORBIDITY WITH BODY MASS INDEX (BMI) OF 31.0 TO 31.9 IN ADULT: ICD-10-CM

## 2024-11-29 DIAGNOSIS — R73.03 PREDIABETES: ICD-10-CM

## 2024-11-29 DIAGNOSIS — E78.2 MIXED HYPERLIPIDEMIA: ICD-10-CM

## 2024-11-29 DIAGNOSIS — Z83.3 FAMILY HISTORY OF DIABETES MELLITUS IN MOTHER: ICD-10-CM

## 2024-11-29 DIAGNOSIS — Z00.00 GENERAL MEDICAL EXAM: Primary | ICD-10-CM

## 2024-11-29 DIAGNOSIS — Z00.00 GENERAL MEDICAL EXAM: ICD-10-CM

## 2024-11-29 PROBLEM — K35.30 ACUTE APPENDICITIS WITH LOCALIZED PERITONITIS, WITHOUT PERFORATION, ABSCESS, OR GANGRENE: Status: RESOLVED | Noted: 2024-07-16 | Resolved: 2024-11-29

## 2024-11-29 PROBLEM — R10.31 COLICKY RLQ ABDOMINAL PAIN: Status: RESOLVED | Noted: 2024-07-16 | Resolved: 2024-11-29

## 2024-11-29 LAB
ALBUMIN SERPL-MCNC: 4.6 G/DL (ref 3.2–4.8)
ALBUMIN/GLOB SERPL: 2.3 {RATIO} (ref 1–2)
ALP LIVER SERPL-CCNC: 46 U/L
ALT SERPL-CCNC: 17 U/L
ANION GAP SERPL CALC-SCNC: 8 MMOL/L (ref 0–18)
AST SERPL-CCNC: 16 U/L (ref ?–34)
BASOPHILS # BLD AUTO: 0.05 X10(3) UL (ref 0–0.2)
BASOPHILS NFR BLD AUTO: 0.9 %
BILIRUB SERPL-MCNC: 0.4 MG/DL (ref 0.3–1.2)
BUN BLD-MCNC: 10 MG/DL (ref 9–23)
CALCIUM BLD-MCNC: 9.7 MG/DL (ref 8.7–10.4)
CHLORIDE SERPL-SCNC: 108 MMOL/L (ref 98–112)
CHOLEST SERPL-MCNC: 238 MG/DL (ref ?–200)
CO2 SERPL-SCNC: 26 MMOL/L (ref 21–32)
CREAT BLD-MCNC: 0.79 MG/DL
EGFRCR SERPLBLD CKD-EPI 2021: 103 ML/MIN/1.73M2 (ref 60–?)
EOSINOPHIL # BLD AUTO: 0.11 X10(3) UL (ref 0–0.7)
EOSINOPHIL NFR BLD AUTO: 1.9 %
ERYTHROCYTE [DISTWIDTH] IN BLOOD BY AUTOMATED COUNT: 12 %
EST. AVERAGE GLUCOSE BLD GHB EST-MCNC: 117 MG/DL (ref 68–126)
FASTING PATIENT LIPID ANSWER: YES
FASTING STATUS PATIENT QL REPORTED: YES
GLOBULIN PLAS-MCNC: 2 G/DL (ref 2–3.5)
GLUCOSE BLD-MCNC: 93 MG/DL (ref 70–99)
HBA1C MFR BLD: 5.7 % (ref ?–5.7)
HBV SURFACE AG SER-ACNC: <0.1 [IU]/L
HBV SURFACE AG SERPL QL IA: NONREACTIVE
HCT VFR BLD AUTO: 39.6 %
HDLC SERPL-MCNC: 44 MG/DL (ref 40–59)
HGB BLD-MCNC: 13.1 G/DL
IMM GRANULOCYTES # BLD AUTO: 0.01 X10(3) UL (ref 0–1)
IMM GRANULOCYTES NFR BLD: 0.2 %
LDLC SERPL CALC-MCNC: 155 MG/DL (ref ?–100)
LYMPHOCYTES # BLD AUTO: 1.97 X10(3) UL (ref 1–4)
LYMPHOCYTES NFR BLD AUTO: 33.6 %
MCH RBC QN AUTO: 30.7 PG (ref 26–34)
MCHC RBC AUTO-ENTMCNC: 33.1 G/DL (ref 31–37)
MCV RBC AUTO: 92.7 FL
MONOCYTES # BLD AUTO: 0.52 X10(3) UL (ref 0.1–1)
MONOCYTES NFR BLD AUTO: 8.9 %
NEUTROPHILS # BLD AUTO: 3.21 X10 (3) UL (ref 1.5–7.7)
NEUTROPHILS # BLD AUTO: 3.21 X10(3) UL (ref 1.5–7.7)
NEUTROPHILS NFR BLD AUTO: 54.5 %
NONHDLC SERPL-MCNC: 194 MG/DL (ref ?–130)
OSMOLALITY SERPL CALC.SUM OF ELEC: 293 MOSM/KG (ref 275–295)
PLATELET # BLD AUTO: 260 10(3)UL (ref 150–450)
POTASSIUM SERPL-SCNC: 4.3 MMOL/L (ref 3.5–5.1)
PROT SERPL-MCNC: 6.6 G/DL (ref 5.7–8.2)
RBC # BLD AUTO: 4.27 X10(6)UL
SODIUM SERPL-SCNC: 142 MMOL/L (ref 136–145)
T PALLIDUM AB SER QL IA: NONREACTIVE
TRIGL SERPL-MCNC: 213 MG/DL (ref 30–149)
TSI SER-ACNC: 1.24 UIU/ML (ref 0.55–4.78)
VLDLC SERPL CALC-MCNC: 41 MG/DL (ref 0–30)
WBC # BLD AUTO: 5.9 X10(3) UL (ref 4–11)

## 2024-11-29 PROCEDURE — 86780 TREPONEMA PALLIDUM: CPT | Performed by: NURSE PRACTITIONER

## 2024-11-29 PROCEDURE — 87340 HEPATITIS B SURFACE AG IA: CPT | Performed by: NURSE PRACTITIONER

## 2024-11-29 PROCEDURE — 83036 HEMOGLOBIN GLYCOSYLATED A1C: CPT | Performed by: NURSE PRACTITIONER

## 2024-11-29 PROCEDURE — 87591 N.GONORRHOEAE DNA AMP PROB: CPT | Performed by: NURSE PRACTITIONER

## 2024-11-29 PROCEDURE — 87389 HIV-1 AG W/HIV-1&-2 AB AG IA: CPT | Performed by: NURSE PRACTITIONER

## 2024-11-29 PROCEDURE — 80061 LIPID PANEL: CPT | Performed by: NURSE PRACTITIONER

## 2024-11-29 PROCEDURE — 80050 GENERAL HEALTH PANEL: CPT | Performed by: NURSE PRACTITIONER

## 2024-11-29 PROCEDURE — 87491 CHLMYD TRACH DNA AMP PROBE: CPT | Performed by: NURSE PRACTITIONER

## 2024-11-29 NOTE — PROGRESS NOTES
HPI:   Patient is here for physical.    Concerns:   Needs form completed for insurance  Would like STD screening    LMP: none with IUD  Contraception: IUD  Last Pap: 2023    Exercise: teaches dance, yoga, HIIT classes  Diet: not very strict, working on portion control    Wt Readings from Last 6 Encounters:   24 181 lb 9.6 oz (82.4 kg)   24 180 lb (81.6 kg)   23 183 lb (83 kg)   23 184 lb (83.5 kg)   17 167 lb 3.2 oz (75.8 kg)     Body mass index is 31.17 kg/m².     Cholesterol, Total (mg/dL)   Date Value   2023 289 (H)     HDL Cholesterol (mg/dL)   Date Value   2023 40     LDL Cholesterol (mg/dL)   Date Value   2023 198 (H)     AST (U/L)   Date Value   2023 21     ALT (U/L)   Date Value   2023 28        Current Outpatient Medications   Medication Sig Dispense Refill    Levonorgestrel 20 MCG/DAY Intrauterine IUD 20 mcg (1 each total) by Intrauterine route continuous.        Past Medical History:    Acute appendicitis with localized peritonitis, without perforation, abscess, or gangrene    Colicky RLQ abdominal pain      Past Surgical History:   Procedure Laterality Date    Mirena, iud, 5 year  2017      2016    Other surgical history  2024    chin liposuction      Family History   Problem Relation Age of Onset    Diabetes Mother     Fibromyalgia Mother     Diabetes Maternal Grandmother     High Blood Pressure Maternal Aunt     High Cholesterol Maternal Aunt     Cancer Maternal Uncle         brain      Social History:   Social History     Socioeconomic History    Marital status: Single   Tobacco Use    Smoking status: Never    Smokeless tobacco: Never   Vaping Use    Vaping status: Never Used   Substance and Sexual Activity    Alcohol use: Yes     Alcohol/week: 0.0 standard drinks of alcohol     Comment: social    Drug use: Not Currently    Sexual activity: Yes     Partners: Male     Birth control/protection: Mirena     Social Drivers  of Health     Food Insecurity: No Food Insecurity (7/16/2024)    Food Insecurity     Food Insecurity: Never true   Transportation Needs: No Transportation Needs (7/16/2024)    Transportation Needs     Lack of Transportation: No   Housing Stability: Low Risk  (7/16/2024)    Housing Stability     Housing Instability: No        REVIEW OF SYSTEMS:   Review of Systems   Constitutional:  Negative for appetite change, chills, fatigue, fever and unexpected weight change.   HENT:  Negative for congestion, ear pain, postnasal drip, rhinorrhea, sore throat and trouble swallowing.    Eyes:  Negative for visual disturbance.   Respiratory:  Negative for cough and shortness of breath.    Cardiovascular:  Negative for chest pain and palpitations.   Gastrointestinal:  Negative for abdominal pain, blood in stool, constipation, diarrhea, nausea and vomiting.   Endocrine: Negative for cold intolerance, heat intolerance, polydipsia, polyphagia and polyuria.   Genitourinary:  Negative for dysuria, frequency, hematuria, menstrual problem, pelvic pain, vaginal bleeding, vaginal discharge and vaginal pain.   Musculoskeletal:  Negative for back pain and myalgias.   Skin:  Negative for rash.   Neurological:  Negative for headaches.   Hematological:  Does not bruise/bleed easily.   Psychiatric/Behavioral:  Negative for dysphoric mood and sleep disturbance. The patient is not nervous/anxious.      ........................................................................................................  EXAM:   /84   Pulse 70   Temp 97.5 °F (36.4 °C)   Resp 16   Ht 5' 4\" (1.626 m)   Wt 181 lb 9.6 oz (82.4 kg)   LMP  (LMP Unknown)   SpO2 99%   BMI 31.17 kg/m²   Ideal body weight: 54.7 kg (120 lb 9.5 oz)  Adjusted ideal body weight: 65.8 kg (144 lb 15.9 oz)   Physical Exam  Constitutional:       General: She is not in acute distress.     Appearance: She is well-developed, well-groomed and overweight. She is not ill-appearing.   HENT:       Right Ear: Tympanic membrane, ear canal and external ear normal.      Left Ear: Tympanic membrane, ear canal and external ear normal.      Nose: Nose normal.      Mouth/Throat:      Mouth: Mucous membranes are moist.      Pharynx: Oropharynx is clear.   Eyes:      Conjunctiva/sclera: Conjunctivae normal.      Pupils: Pupils are equal, round, and reactive to light.   Neck:      Thyroid: No thyromegaly.   Cardiovascular:      Rate and Rhythm: Normal rate and regular rhythm.      Heart sounds: Normal heart sounds.   Pulmonary:      Effort: Pulmonary effort is normal.      Breath sounds: Normal breath sounds.   Abdominal:      General: Abdomen is flat. Bowel sounds are normal.      Palpations: Abdomen is soft.      Tenderness: There is no abdominal tenderness.   Musculoskeletal:         General: Normal range of motion.      Cervical back: Normal range of motion.   Skin:     General: Skin is warm and dry.   Neurological:      General: No focal deficit present.      Mental Status: She is alert and oriented to person, place, and time.      Cranial Nerves: No cranial nerve deficit.   Psychiatric:         Mood and Affect: Mood normal.         ASSESSMENT AND PLAN:   Diagnoses and all orders for this visit:    General medical exam  -     Hemoglobin A1C; Future  -     TSH W Reflex To Free T4; Future  -     Comp Metabolic Panel (14); Future  -     Lipid Panel; Future  -     CBC With Differential With Platelet; Future    Screen for STD (sexually transmitted disease)  -     T Pallidum Screening [place]; Future  -     HIV AG AB Combo; Future  -     Urine Chlamydia/GC Amplification; Future  -     Hepatitis B Surface Antigen; Future    Mixed hyperlipidemia  -     Comp Metabolic Panel (14); Future  -     Lipid Panel; Future    Class 1 obesity due to excess calories without serious comorbidity with body mass index (BMI) of 31.0 to 31.9 in adult    Family history of diabetes mellitus in mother  -     Hemoglobin A1C;  Future    History of pregnancy induced hypertension

## 2024-12-02 ENCOUNTER — TELEPHONE (OUTPATIENT)
Dept: FAMILY MEDICINE CLINIC | Facility: CLINIC | Age: 29
End: 2024-12-02

## 2024-12-02 DIAGNOSIS — E78.2 MIXED HYPERLIPIDEMIA: Primary | ICD-10-CM

## 2024-12-02 LAB
C TRACH DNA SPEC QL NAA+PROBE: POSITIVE
N GONORRHOEA DNA SPEC QL NAA+PROBE: NEGATIVE

## 2024-12-02 RX ORDER — DOXYCYCLINE 100 MG/1
100 TABLET ORAL 2 TIMES DAILY
Qty: 20 TABLET | Refills: 0 | Status: SHIPPED | OUTPATIENT
Start: 2024-12-02 | End: 2024-12-12

## 2024-12-02 NOTE — TELEPHONE ENCOUNTER
She could try Omega 3 and adding fiber intake  Her cholesterol has improved from last year so reasonable if she wants to try those and work on diet  Recheck in 6 months

## 2024-12-02 NOTE — TELEPHONE ENCOUNTER
----- Message from Melody Potter sent at 12/2/2024  2:18 PM CST -----  Results reviewed.   + Chlamydia. Prescription sent to pharmacy. Needs to complete all antibiotics. Notify all partners. Abstain from intercourse for 1 week after both partners have completed antibiotic. Will also be contacted by health dept to confirm she received result and treatment.

## 2024-12-02 NOTE — TELEPHONE ENCOUNTER
He was just tested last month and everything came back negative for him. The last time we were intimate was in August though and I haven’t been with anyone else since then . If I had gotten it before being with him, is it possible for it to not have passed to him? Just trying to make sense of it as this is a first ! Thank you

## 2024-12-02 NOTE — TELEPHONE ENCOUNTER
I would recommend that he follow up with his provider and get their opinion on treatment. They may want to retest or go ahead and give him the antibiotic as well

## 2024-12-02 NOTE — TELEPHONE ENCOUNTER
----- Message from Melody Potter sent at 12/2/2024  8:46 AM CST -----  Results reviewed.   Stable in prediabetic range  Cholesterol elevated. Would recommend statin therapy  Chemistries normal  HIV, hepatitis B, syphilis screening negative  Thyroid function normal  No anemia

## 2024-12-02 NOTE — TELEPHONE ENCOUNTER
Hi, are there any other types of more thorough cholesterol panels or testing I can do before resorting to medication or other supplements I can take ?

## 2024-12-30 ENCOUNTER — LAB ENCOUNTER (OUTPATIENT)
Dept: LAB | Age: 29
End: 2024-12-30
Attending: NURSE PRACTITIONER
Payer: COMMERCIAL

## 2024-12-30 DIAGNOSIS — A74.9 CHLAMYDIA: ICD-10-CM

## 2024-12-30 PROCEDURE — 87591 N.GONORRHOEAE DNA AMP PROB: CPT

## 2024-12-30 PROCEDURE — 87491 CHLMYD TRACH DNA AMP PROBE: CPT

## 2024-12-31 LAB
C TRACH DNA SPEC QL NAA+PROBE: NEGATIVE
N GONORRHOEA DNA SPEC QL NAA+PROBE: NEGATIVE

## (undated) DEVICE — LAPAROVUE VISIBILITY SYSTEM LAPAROSCOPIC SOLUTIONS: Brand: LAPAROVUE

## (undated) DEVICE — GLOVE SUR 7 SENSICARE PI PIP CRM PWD F

## (undated) DEVICE — LAP CHOLE/APPY CDS-LF: Brand: MEDLINE INDUSTRIES, INC.

## (undated) DEVICE — SUT COAT VCRL + 0 54IN ABSRB UD ANTIBACT

## (undated) DEVICE — POWER SHELL: Brand: SIGNIA

## (undated) DEVICE — MARYLAND JAW LAPAROSCOPIC SEALER/DIVIDER COATED: Brand: LIGASURE

## (undated) DEVICE — TRAY CATH 16FR F INCL BARDX IC COMPLT CARE

## (undated) DEVICE — #11 STERILE BLADE: Brand: POLYMER COATED BLADES

## (undated) DEVICE — SUT PDS II 0 27IN CT-1 ABSRB VLT L36MM 1/2

## (undated) DEVICE — HUNTER GASPER TIP, DISPOSABLE: Brand: RENEW

## (undated) DEVICE — TROCAR: Brand: KII FIOS FIRST ENTRY

## (undated) DEVICE — ADHESIVE SKIN TOP FOR WND CLSR DERMBND ADV

## (undated) DEVICE — SLEEVE COMPR MD KNEE LEN SGL USE KENDALL SCD

## (undated) DEVICE — SUT MCRYL 4-0 18IN PS-2 ABSRB UD 19MM 3/8 CIR

## (undated) DEVICE — POUCH SPECIMEN WIRE 6X3 250ML

## (undated) DEVICE — ENDOPATH ULTRA VERESS INSUFFLATION NEEDLES WITH LUER LOCK CONNECTORS: Brand: ENDOPATH

## (undated) DEVICE — COVER LT HNDL RIG FOR SUR CAM DISP

## (undated) DEVICE — GLOVE SUR 7.5 SENSICARE PI PIP GRN PWD F

## (undated) DEVICE — TRADITIONAL MARYLAND DISSECTOR TIP, DISPOSABLE: Brand: RENEW

## (undated) DEVICE — APPLICATOR PREP 26ML CHG 2% ISO ALC 70%

## (undated) DEVICE — Device: Brand: SUTURE PASSOR PRO

## (undated) DEVICE — ARTICULATION RELOAD WITH TRI-STAPLE TECHNOLOGY: Brand: ENDO GIA

## (undated) DEVICE — SOLUTION IRRIG 1000ML 0.9% NACL USP BTL

## (undated) DEVICE — 40580 - THE PINK PAD - ADVANCED TRENDELENBURG POSITIONING KIT: Brand: 40580 - THE PINK PAD - ADVANCED TRENDELENBURG POSITIONING KIT

## (undated) DEVICE — DISPOSABLE GRASPER: Brand: EPIX LAPAROSCOPIC GRASPER

## (undated) DEVICE — TROCAR: Brand: KII® SLEEVE

## (undated) NOTE — LETTER
05/01/24    Adrianna Mcdonald   [address] [facility] [address]           Dear Adrianna Mcdonald     Our records indicate that you have outstanding lab work and/or testing that was ordered for you and has not yet been completed:  Lab Frequency Next Occurrence   Hemoglobin A1C Once 05/29/2024   Lipid Panel [E] Once 02/29/2024      To provide you with the best possible care, please complete these orders at your earliest convenience. If you have recently completed these orders please disregard this letter.   To schedule imaging, or outpatient tests please call Central Scheduling at [phone].  For any blood work needed, you can get this done at the Reference Lab in our [place] office anytime Monday-Friday from 8am-4:15pm and you do not need an appointment. They are closed Saturday and Sunday. If you need a time outside of these hours please call us to schedule an appointment.   *If you prefer to use [facility] for your labs please let us know so we can fax your orders.     Thank you,    [facility]

## (undated) NOTE — LETTER
[address]  [address]  Authorization for Surgical Operation and Procedure     Date:___________                                                                                                         Time:__________  I hereby authorize Surgeon(s):  Luke Juarez MD, my physician and his/her assistants (if applicable), which may include medical students, residents, and/or fellows, to perform the following surgical operation/ procedure and administer such anesthesia as may be determined necessary by my physician:  Operation/Procedure name (s) Procedure(s):  LAPAROSCOPIC APPENDECTOMY on Adrianna Mcdonald   2.   I recognize that during the surgical operation/procedure, unforeseen conditions may necessitate additional or different procedures than those listed above.  I, therefore, further authorize and request that the above-named surgeon, assistants, or designees perform such procedures as are, in their judgment, necessary and desirable.    3.   My surgeon/physician has discussed prior to my surgery the potential benefits, risks and side effects of this procedure; the likelihood of achieving goals; and potential problems that might occur during recuperation.  They also discussed reasonable alternatives to the procedure, including risks, benefits, and side effects related to the alternatives and risks related to not receiving this procedure.  I have had all my questions answered and I acknowledge that no guarantee has been made as to the result that may be obtained.    4.   Should the need arise during my operation/procedure, which includes change of level of care prior to discharge, I also consent to the administration of blood and/or blood products.  Further, I understand that despite careful testing and screening of blood or blood products by collecting agencies, I may still be subject to ill effects as a result of receiving a blood transfusion and/or blood products.  The  following are some, but not all, of the potential risks that can occur: fever and allergic reactions, hemolytic reactions, transmission of diseases such as Hepatitis, AIDS and Cytomegalovirus (CMV) and fluid overload.  In the event that I wish to have an autologous transfusion of my own blood, or a directed donor transfusion, I will discuss this with my physician.  Check only if Refusing Blood or Blood Products  I understand refusal of blood or blood products as deemed necessary by my physician may have serious consequences to my condition to include possible death. I hereby assume responsibility for my refusal and release the hospital, its personnel, and my physicians from any responsibility for the consequences of my refusal.          o  Refuse      5.   I authorize the use of any specimen, organs, tissues, body parts or foreign objects that may be removed from my body during the operation/procedure for diagnosis, research or teaching purposes and their subsequent disposal by hospital authorities.  I also authorize the release of specimen test results and/or written reports to my treating physician on the hospital medical staff or other referring or consulting physicians involved in my care, at the discretion of the Pathologist or my treating physician.    6.   I consent to the photographing or videotaping of the operations or procedures to be performed, including appropriate portions of my body for medical, scientific, or educational purposes, provided my identity is not revealed by the pictures or by descriptive texts accompanying them.  If the procedure has been photographed/videotaped, the surgeon will obtain the original picture, image, videotape or CD.  The hospital will not be responsible for storage, release or maintenance of the picture, image, tape or CD.    7.   I consent to the presence of a  or observers in the operating room as deemed necessary by my physician or their designees.     8.   I recognize that in the event my procedure results in extended X-Ray/fluoroscopy time, I may develop a skin reaction.    9. If I have a Do Not Attempt Resuscitation (DNAR) order in place, that status will be suspended while in the operating room, procedural suite, and during the recovery period unless otherwise explicitly stated by me (or a person authorized to consent on my behalf). The surgeon or my attending physician will determine when the applicable recovery period ends for purposes of reinstating the DNAR order.  10. Patients having a sterilization procedure: I understand that if the procedure is successful the results will be permanent and it will therefore be impossible for me to inseminate, conceive, or bear children.  I also understand that the procedure is intended to result in sterility, although the result has not been guaranteed.   11. I acknowledge that my physician has explained sedation/analgesia administration to me including the risk and benefits I consent to the administration of sedation/analgesia as may be necessary or desirable in the judgment of my physician.    I CERTIFY THAT I HAVE READ AND FULLY UNDERSTAND THE ABOVE CONSENT TO OPERATION and/or OTHER PROCEDURE.    _________________________________________  __________________________________  Signature of Patient     Signature of Responsible Person         ___________________________________         Printed Name of Responsible Person           _________________________________                 Relationship to Patient  _________________________________________  ______________________________  Signature of Witness          Date  Time      Patient Name: Adrianna SAGE Tamara     : 1995                 Printed: 2024     Medical Record #: [id number]                     Page [address] of [address] [place] [address]  [id number]    Consent for Anesthesia    I, Adrianna SAGE  Tamara agree to be cared for by an anesthesiologist, who is specially trained to monitor me and give me medicine to put me to sleep or keep me comfortable during my procedure    I understand that my anesthesiologist is not an employee or agent of [facility] or [employer]. He or she works for [employer].    As the patient asking for anesthesia services, I agree to:  Allow the anesthesiologist (anesthesia doctor) to give me medicine and do additional procedures as necessary. Some examples are: Starting or using an “IV” to give me medicine, fluids or blood during my procedure, and having a breathing tube placed to help me breathe when I’m asleep (intubation). In the event that my heart stops working properly, I understand that my anesthesiologist will make every effort to sustain my life, unless otherwise directed by [facility] Do Not Resuscitate documents.  Tell my anesthesia doctor before my procedure:  If I am pregnant.  The last time that I ate or drank.  All of the medicines I take (including prescriptions, herbal supplements, and pills I can buy without a prescription (including street drugs/illegal medications). Failure to inform my anesthesiologist about these medicines may increase my risk of anesthetic complications.  If I am allergic to anything or have had a reaction to anesthesia before.  I understand how the anesthesia medicine will help me (benefits).  I understand that with any type of anesthesia medicine there are risks:  The most common risks are: nausea, vomiting, sore throat, muscle soreness, damage to my eyes, mouth, or teeth (from breathing tube placement).  Rare risks include: remembering what happened during my procedure, allergic reactions to medications, injury to my airway, heart, lungs, vision, nerves, or muscles and in extremely rare instances death.  My doctor has explained to me other choices available to me for my care  (alternatives).  Pregnant Patients (“epidural”):  I understand that the risks of having an epidural (medicine given into my back to help control pain during labor), include itching, low blood pressure, difficulty urinating, headache or slowing of the baby’s heart. Very rare risks include infection, bleeding, seizure, irregular heart rhythms and nerve injury.  Regional Anesthesia (“spinal”, “epidural”, & “nerve blocks”):  I understand that rare but potential complications include headache, bleeding, infection, seizure, irregular heart rhythms, and nerve injury.    I can change my mind about having anesthesia services at any time before I get the medicine.    _____________________________________________________________________________  Patient (or Representative) Signature/Relationship to Patient  Date   Time    _____________________________________________________________________________   Name (if used)    Language/Organization   Time    _____________________________________________________________________________  Anesthesiologist Signature     Date   Time  I have discussed the procedure and information above with the patient (or patient’s representative) and answered their questions. The patient or their representative has agreed to have anesthesia services.    _____________________________________________________________________________  Witness        Date   Time  I have verified that the signature is that of the patient or patient’s representative, and that it was signed before the procedure  Patient Name: Adrianna SAGE Tamara     : 1995                 Printed: 2024     Medical Record #: [id number]                     Page 2 of 2